# Patient Record
Sex: MALE | Race: WHITE | NOT HISPANIC OR LATINO | Employment: OTHER | URBAN - METROPOLITAN AREA
[De-identification: names, ages, dates, MRNs, and addresses within clinical notes are randomized per-mention and may not be internally consistent; named-entity substitution may affect disease eponyms.]

---

## 2021-08-30 ENCOUNTER — HOSPITAL ENCOUNTER (INPATIENT)
Facility: HOSPITAL | Age: 46
LOS: 6 days | Discharge: HOME | End: 2021-09-05
Attending: EMERGENCY MEDICINE | Admitting: HOSPITALIST

## 2021-08-30 ENCOUNTER — APPOINTMENT (OUTPATIENT)
Dept: CT IMAGING | Facility: HOSPITAL | Age: 46
End: 2021-08-30

## 2021-08-30 DIAGNOSIS — K71.6 DRUG-INDUCED HEPATITIS: ICD-10-CM

## 2021-08-30 DIAGNOSIS — T50.905A DRUG-INDUCED HEPATITIS: ICD-10-CM

## 2021-08-30 DIAGNOSIS — T39.1X1A ACCIDENTAL ACETAMINOPHEN OVERDOSE, INITIAL ENCOUNTER: Primary | ICD-10-CM

## 2021-08-30 LAB
ALBUMIN SERPL-MCNC: 4.8 G/DL (ref 3.5–5.2)
ALBUMIN/GLOB SERPL: 1.5 G/DL
ALP SERPL-CCNC: 95 U/L (ref 39–117)
ALT SERPL W P-5'-P-CCNC: 2561 U/L (ref 1–41)
ANION GAP SERPL CALCULATED.3IONS-SCNC: 13.2 MMOL/L (ref 5–15)
APAP SERPL-MCNC: 10 MCG/ML (ref 0–30)
APTT PPP: 35.9 SECONDS (ref 22.7–35.4)
AST SERPL-CCNC: 3384 U/L (ref 1–40)
BASOPHILS # BLD AUTO: 0.07 10*3/MM3 (ref 0–0.2)
BASOPHILS NFR BLD AUTO: 0.4 % (ref 0–1.5)
BILIRUB SERPL-MCNC: 1.3 MG/DL (ref 0–1.2)
BUN SERPL-MCNC: 15 MG/DL (ref 6–20)
BUN/CREAT SERPL: 13.9 (ref 7–25)
CALCIUM SPEC-SCNC: 9.6 MG/DL (ref 8.6–10.5)
CHLORIDE SERPL-SCNC: 103 MMOL/L (ref 98–107)
CO2 SERPL-SCNC: 22.8 MMOL/L (ref 22–29)
CREAT SERPL-MCNC: 1.08 MG/DL (ref 0.76–1.27)
D-LACTATE SERPL-SCNC: 2.6 MMOL/L (ref 0.5–2)
DEPRECATED RDW RBC AUTO: 43 FL (ref 37–54)
EOSINOPHIL # BLD AUTO: 0 10*3/MM3 (ref 0–0.4)
EOSINOPHIL NFR BLD AUTO: 0 % (ref 0.3–6.2)
ERYTHROCYTE [DISTWIDTH] IN BLOOD BY AUTOMATED COUNT: 12.9 % (ref 12.3–15.4)
ETHANOL BLD-MCNC: <10 MG/DL (ref 0–10)
ETHANOL UR QL: <0.01 %
GFR SERPL CREATININE-BSD FRML MDRD: 74 ML/MIN/1.73
GLOBULIN UR ELPH-MCNC: 3.2 GM/DL
GLUCOSE SERPL-MCNC: 108 MG/DL (ref 65–99)
HCT VFR BLD AUTO: 49.8 % (ref 37.5–51)
HGB BLD-MCNC: 17.6 G/DL (ref 13–17.7)
IMM GRANULOCYTES # BLD AUTO: 0.15 10*3/MM3 (ref 0–0.05)
IMM GRANULOCYTES NFR BLD AUTO: 0.8 % (ref 0–0.5)
INR PPP: 1.7 (ref 0.9–1.1)
LIPASE SERPL-CCNC: 15 U/L (ref 13–60)
LYMPHOCYTES # BLD AUTO: 0.82 10*3/MM3 (ref 0.7–3.1)
LYMPHOCYTES NFR BLD AUTO: 4.6 % (ref 19.6–45.3)
MCH RBC QN AUTO: 32.5 PG (ref 26.6–33)
MCHC RBC AUTO-ENTMCNC: 35.3 G/DL (ref 31.5–35.7)
MCV RBC AUTO: 92.1 FL (ref 79–97)
MONOCYTES # BLD AUTO: 0.54 10*3/MM3 (ref 0.1–0.9)
MONOCYTES NFR BLD AUTO: 3 % (ref 5–12)
NEUTROPHILS NFR BLD AUTO: 16.19 10*3/MM3 (ref 1.7–7)
NEUTROPHILS NFR BLD AUTO: 91.2 % (ref 42.7–76)
NRBC BLD AUTO-RTO: 0 /100 WBC (ref 0–0.2)
PLATELET # BLD AUTO: 169 10*3/MM3 (ref 140–450)
PMV BLD AUTO: 10.2 FL (ref 6–12)
POTASSIUM SERPL-SCNC: 4.2 MMOL/L (ref 3.5–5.2)
PROCALCITONIN SERPL-MCNC: 50.39 NG/ML (ref 0–0.25)
PROT SERPL-MCNC: 8 G/DL (ref 6–8.5)
PROTHROMBIN TIME: 19.8 SECONDS (ref 11.7–14.2)
RBC # BLD AUTO: 5.41 10*6/MM3 (ref 4.14–5.8)
SALICYLATES SERPL-MCNC: <0.3 MG/DL
SODIUM SERPL-SCNC: 139 MMOL/L (ref 136–145)
WBC # BLD AUTO: 17.77 10*3/MM3 (ref 3.4–10.8)

## 2021-08-30 PROCEDURE — 87040 BLOOD CULTURE FOR BACTERIA: CPT | Performed by: EMERGENCY MEDICINE

## 2021-08-30 PROCEDURE — 80143 DRUG ASSAY ACETAMINOPHEN: CPT | Performed by: EMERGENCY MEDICINE

## 2021-08-30 PROCEDURE — 80053 COMPREHEN METABOLIC PANEL: CPT | Performed by: EMERGENCY MEDICINE

## 2021-08-30 PROCEDURE — 87077 CULTURE AEROBIC IDENTIFY: CPT | Performed by: EMERGENCY MEDICINE

## 2021-08-30 PROCEDURE — 25010000003 ACETYLCYSTEINE PER 100 MG: Performed by: EMERGENCY MEDICINE

## 2021-08-30 PROCEDURE — 87186 SC STD MICRODIL/AGAR DIL: CPT | Performed by: EMERGENCY MEDICINE

## 2021-08-30 PROCEDURE — 70450 CT HEAD/BRAIN W/O DYE: CPT

## 2021-08-30 PROCEDURE — 83690 ASSAY OF LIPASE: CPT | Performed by: EMERGENCY MEDICINE

## 2021-08-30 PROCEDURE — 25010000002 PIPERACILLIN SOD-TAZOBACTAM PER 1 G: Performed by: EMERGENCY MEDICINE

## 2021-08-30 PROCEDURE — 0 IOPAMIDOL PER 1 ML: Performed by: HOSPITALIST

## 2021-08-30 PROCEDURE — 93005 ELECTROCARDIOGRAM TRACING: CPT | Performed by: EMERGENCY MEDICINE

## 2021-08-30 PROCEDURE — 74177 CT ABD & PELVIS W/CONTRAST: CPT

## 2021-08-30 PROCEDURE — 83605 ASSAY OF LACTIC ACID: CPT | Performed by: EMERGENCY MEDICINE

## 2021-08-30 PROCEDURE — 80179 DRUG ASSAY SALICYLATE: CPT | Performed by: EMERGENCY MEDICINE

## 2021-08-30 PROCEDURE — 87150 DNA/RNA AMPLIFIED PROBE: CPT | Performed by: EMERGENCY MEDICINE

## 2021-08-30 PROCEDURE — 85610 PROTHROMBIN TIME: CPT | Performed by: EMERGENCY MEDICINE

## 2021-08-30 PROCEDURE — 93010 ELECTROCARDIOGRAM REPORT: CPT | Performed by: INTERNAL MEDICINE

## 2021-08-30 PROCEDURE — G0432 EIA HIV-1/HIV-2 SCREEN: HCPCS | Performed by: EMERGENCY MEDICINE

## 2021-08-30 PROCEDURE — 80074 ACUTE HEPATITIS PANEL: CPT | Performed by: EMERGENCY MEDICINE

## 2021-08-30 PROCEDURE — 85025 COMPLETE CBC W/AUTO DIFF WBC: CPT | Performed by: EMERGENCY MEDICINE

## 2021-08-30 PROCEDURE — U0003 INFECTIOUS AGENT DETECTION BY NUCLEIC ACID (DNA OR RNA); SEVERE ACUTE RESPIRATORY SYNDROME CORONAVIRUS 2 (SARS-COV-2) (CORONAVIRUS DISEASE [COVID-19]), AMPLIFIED PROBE TECHNIQUE, MAKING USE OF HIGH THROUGHPUT TECHNOLOGIES AS DESCRIBED BY CMS-2020-01-R: HCPCS | Performed by: EMERGENCY MEDICINE

## 2021-08-30 PROCEDURE — 99284 EMERGENCY DEPT VISIT MOD MDM: CPT

## 2021-08-30 PROCEDURE — 84145 PROCALCITONIN (PCT): CPT | Performed by: EMERGENCY MEDICINE

## 2021-08-30 PROCEDURE — 85730 THROMBOPLASTIN TIME PARTIAL: CPT | Performed by: EMERGENCY MEDICINE

## 2021-08-30 PROCEDURE — 82077 ASSAY SPEC XCP UR&BREATH IA: CPT | Performed by: EMERGENCY MEDICINE

## 2021-08-30 RX ORDER — SODIUM CHLORIDE 0.9 % (FLUSH) 0.9 %
10 SYRINGE (ML) INJECTION AS NEEDED
Status: DISCONTINUED | OUTPATIENT
Start: 2021-08-30 | End: 2021-09-05 | Stop reason: HOSPADM

## 2021-08-30 RX ADMIN — SODIUM CHLORIDE 2121 ML: 9 INJECTION, SOLUTION INTRAVENOUS at 23:04

## 2021-08-30 RX ADMIN — SODIUM CHLORIDE 1000 ML: 9 INJECTION, SOLUTION INTRAVENOUS at 21:12

## 2021-08-30 RX ADMIN — IOPAMIDOL 85 ML: 755 INJECTION, SOLUTION INTRAVENOUS at 23:38

## 2021-08-30 RX ADMIN — TAZOBACTAM SODIUM AND PIPERACILLIN SODIUM 3.38 G: 375; 3 INJECTION, SOLUTION INTRAVENOUS at 23:16

## 2021-08-30 RX ADMIN — ACETYLCYSTEINE 15000 MG: 200 INJECTION, SOLUTION INTRAVENOUS at 23:17

## 2021-08-31 ENCOUNTER — ON CAMPUS - OUTPATIENT (OUTPATIENT)
Dept: URBAN - METROPOLITAN AREA HOSPITAL 113 | Facility: HOSPITAL | Age: 46
End: 2021-08-31
Payer: MEDICARE

## 2021-08-31 DIAGNOSIS — R94.5 ABNORMAL RESULTS OF LIVER FUNCTION STUDIES: ICD-10-CM

## 2021-08-31 DIAGNOSIS — R11.2 NAUSEA WITH VOMITING, UNSPECIFIED: ICD-10-CM

## 2021-08-31 LAB
ALBUMIN SERPL-MCNC: 3.6 G/DL (ref 3.5–5.2)
ALBUMIN SERPL-MCNC: 4.3 G/DL (ref 3.5–5.2)
ALBUMIN/GLOB SERPL: 1.4 G/DL
ALBUMIN/GLOB SERPL: 1.4 G/DL
ALP SERPL-CCNC: 68 U/L (ref 39–117)
ALP SERPL-CCNC: 78 U/L (ref 39–117)
ALT SERPL W P-5'-P-CCNC: 2363 U/L (ref 1–41)
ALT SERPL W P-5'-P-CCNC: 2427 U/L (ref 1–41)
AMMONIA BLD-SCNC: 37 UMOL/L (ref 16–60)
AMPHET+METHAMPHET UR QL: NEGATIVE
ANION GAP SERPL CALCULATED.3IONS-SCNC: 11.6 MMOL/L (ref 5–15)
ANION GAP SERPL CALCULATED.3IONS-SCNC: 13.8 MMOL/L (ref 5–15)
AST SERPL-CCNC: 2166 U/L (ref 1–40)
AST SERPL-CCNC: 2383 U/L (ref 1–40)
BARBITURATES UR QL SCN: NEGATIVE
BASOPHILS # BLD AUTO: 0.05 10*3/MM3 (ref 0–0.2)
BASOPHILS NFR BLD AUTO: 0.3 % (ref 0–1.5)
BENZODIAZ UR QL SCN: NEGATIVE
BILIRUB SERPL-MCNC: 1.5 MG/DL (ref 0–1.2)
BILIRUB SERPL-MCNC: 1.7 MG/DL (ref 0–1.2)
BUN SERPL-MCNC: 14 MG/DL (ref 6–20)
BUN SERPL-MCNC: 16 MG/DL (ref 6–20)
BUN/CREAT SERPL: 14.6 (ref 7–25)
BUN/CREAT SERPL: 15.4 (ref 7–25)
CALCIUM SPEC-SCNC: 8.2 MG/DL (ref 8.6–10.5)
CALCIUM SPEC-SCNC: 8.7 MG/DL (ref 8.6–10.5)
CANNABINOIDS SERPL QL: NEGATIVE
CHLORIDE SERPL-SCNC: 104 MMOL/L (ref 98–107)
CHLORIDE SERPL-SCNC: 106 MMOL/L (ref 98–107)
CO2 SERPL-SCNC: 22.2 MMOL/L (ref 22–29)
CO2 SERPL-SCNC: 22.4 MMOL/L (ref 22–29)
COCAINE UR QL: NEGATIVE
CREAT SERPL-MCNC: 0.96 MG/DL (ref 0.76–1.27)
CREAT SERPL-MCNC: 1.04 MG/DL (ref 0.76–1.27)
D-LACTATE SERPL-SCNC: 3.1 MMOL/L (ref 0.5–2)
DEPRECATED RDW RBC AUTO: 46.7 FL (ref 37–54)
EOSINOPHIL # BLD AUTO: 0.01 10*3/MM3 (ref 0–0.4)
EOSINOPHIL NFR BLD AUTO: 0.1 % (ref 0.3–6.2)
ERYTHROCYTE [DISTWIDTH] IN BLOOD BY AUTOMATED COUNT: 13.2 % (ref 12.3–15.4)
GFR SERPL CREATININE-BSD FRML MDRD: 77 ML/MIN/1.73
GFR SERPL CREATININE-BSD FRML MDRD: 85 ML/MIN/1.73
GGT SERPL-CCNC: 61 U/L (ref 8–61)
GLOBULIN UR ELPH-MCNC: 2.6 GM/DL
GLOBULIN UR ELPH-MCNC: 3 GM/DL
GLUCOSE SERPL-MCNC: 100 MG/DL (ref 65–99)
GLUCOSE SERPL-MCNC: 105 MG/DL (ref 65–99)
HAV IGM SERPL QL IA: NORMAL
HBV CORE IGM SERPL QL IA: NORMAL
HBV SURFACE AG SERPL QL IA: NORMAL
HCT VFR BLD AUTO: 50.1 % (ref 37.5–51)
HCV AB SER DONR QL: NORMAL
HGB BLD-MCNC: 16.6 G/DL (ref 13–17.7)
HIV1+2 AB SER QL: NORMAL
IMM GRANULOCYTES # BLD AUTO: 0.08 10*3/MM3 (ref 0–0.05)
IMM GRANULOCYTES NFR BLD AUTO: 0.5 % (ref 0–0.5)
INR PPP: 2.33 (ref 0.9–1.1)
LYMPHOCYTES # BLD AUTO: 1.26 10*3/MM3 (ref 0.7–3.1)
LYMPHOCYTES NFR BLD AUTO: 7.5 % (ref 19.6–45.3)
MCH RBC QN AUTO: 31.7 PG (ref 26.6–33)
MCHC RBC AUTO-ENTMCNC: 33.1 G/DL (ref 31.5–35.7)
MCV RBC AUTO: 95.6 FL (ref 79–97)
METHADONE UR QL SCN: NEGATIVE
MONOCYTES # BLD AUTO: 0.64 10*3/MM3 (ref 0.1–0.9)
MONOCYTES NFR BLD AUTO: 3.8 % (ref 5–12)
NEUTROPHILS NFR BLD AUTO: 14.81 10*3/MM3 (ref 1.7–7)
NEUTROPHILS NFR BLD AUTO: 87.8 % (ref 42.7–76)
NRBC BLD AUTO-RTO: 0 /100 WBC (ref 0–0.2)
OPIATES UR QL: NEGATIVE
OXYCODONE UR QL SCN: NEGATIVE
PLATELET # BLD AUTO: 153 10*3/MM3 (ref 140–450)
PMV BLD AUTO: 10.1 FL (ref 6–12)
POTASSIUM SERPL-SCNC: 3.7 MMOL/L (ref 3.5–5.2)
POTASSIUM SERPL-SCNC: 4.2 MMOL/L (ref 3.5–5.2)
PROT SERPL-MCNC: 6.2 G/DL (ref 6–8.5)
PROT SERPL-MCNC: 7.3 G/DL (ref 6–8.5)
PROTHROMBIN TIME: 25.3 SECONDS (ref 11.7–14.2)
QT INTERVAL: 331 MS
RBC # BLD AUTO: 5.24 10*6/MM3 (ref 4.14–5.8)
SARS-COV-2 RNA RESP QL NAA+PROBE: NOT DETECTED
SODIUM SERPL-SCNC: 138 MMOL/L (ref 136–145)
SODIUM SERPL-SCNC: 142 MMOL/L (ref 136–145)
WBC # BLD AUTO: 16.85 10*3/MM3 (ref 3.4–10.8)

## 2021-08-31 PROCEDURE — 25010000003 ACETYLCYSTEINE PER 100 MG: Performed by: INTERNAL MEDICINE

## 2021-08-31 PROCEDURE — 80307 DRUG TEST PRSMV CHEM ANLYZR: CPT | Performed by: EMERGENCY MEDICINE

## 2021-08-31 PROCEDURE — 36415 COLL VENOUS BLD VENIPUNCTURE: CPT | Performed by: EMERGENCY MEDICINE

## 2021-08-31 PROCEDURE — 86664 EPSTEIN-BARR NUCLEAR ANTIGEN: CPT | Performed by: INTERNAL MEDICINE

## 2021-08-31 PROCEDURE — 25010000002 PIPERACILLIN SOD-TAZOBACTAM PER 1 G: Performed by: HOSPITALIST

## 2021-08-31 PROCEDURE — 85025 COMPLETE CBC W/AUTO DIFF WBC: CPT | Performed by: HOSPITALIST

## 2021-08-31 PROCEDURE — 82977 ASSAY OF GGT: CPT | Performed by: HOSPITALIST

## 2021-08-31 PROCEDURE — 25010000002 ONDANSETRON PER 1 MG: Performed by: HOSPITALIST

## 2021-08-31 PROCEDURE — 82140 ASSAY OF AMMONIA: CPT | Performed by: INTERNAL MEDICINE

## 2021-08-31 PROCEDURE — 80053 COMPREHEN METABOLIC PANEL: CPT | Performed by: INTERNAL MEDICINE

## 2021-08-31 PROCEDURE — 90791 PSYCH DIAGNOSTIC EVALUATION: CPT

## 2021-08-31 PROCEDURE — 83605 ASSAY OF LACTIC ACID: CPT | Performed by: EMERGENCY MEDICINE

## 2021-08-31 PROCEDURE — 99222 1ST HOSP IP/OBS MODERATE 55: CPT | Performed by: INTERNAL MEDICINE

## 2021-08-31 PROCEDURE — 85610 PROTHROMBIN TIME: CPT | Performed by: INTERNAL MEDICINE

## 2021-08-31 PROCEDURE — 86665 EPSTEIN-BARR CAPSID VCA: CPT | Performed by: INTERNAL MEDICINE

## 2021-08-31 PROCEDURE — 80053 COMPREHEN METABOLIC PANEL: CPT | Performed by: HOSPITALIST

## 2021-08-31 RX ORDER — FAMOTIDINE 10 MG/ML
20 INJECTION, SOLUTION INTRAVENOUS EVERY 12 HOURS SCHEDULED
Status: DISCONTINUED | OUTPATIENT
Start: 2021-08-31 | End: 2021-09-01

## 2021-08-31 RX ORDER — SODIUM CHLORIDE 9 MG/ML
175 INJECTION, SOLUTION INTRAVENOUS CONTINUOUS
Status: DISCONTINUED | OUTPATIENT
Start: 2021-08-31 | End: 2021-09-01

## 2021-08-31 RX ORDER — ONDANSETRON 2 MG/ML
4 INJECTION INTRAMUSCULAR; INTRAVENOUS EVERY 4 HOURS PRN
Status: DISCONTINUED | OUTPATIENT
Start: 2021-08-31 | End: 2021-09-05

## 2021-08-31 RX ADMIN — SODIUM CHLORIDE 125 ML/HR: 9 INJECTION, SOLUTION INTRAVENOUS at 11:18

## 2021-08-31 RX ADMIN — ONDANSETRON 4 MG: 2 INJECTION INTRAMUSCULAR; INTRAVENOUS at 03:27

## 2021-08-31 RX ADMIN — FAMOTIDINE 20 MG: 10 INJECTION INTRAVENOUS at 20:11

## 2021-08-31 RX ADMIN — SODIUM CHLORIDE 175 ML/HR: 9 INJECTION, SOLUTION INTRAVENOUS at 18:24

## 2021-08-31 RX ADMIN — FAMOTIDINE 20 MG: 10 INJECTION INTRAVENOUS at 08:32

## 2021-08-31 RX ADMIN — ACETYLCYSTEINE 10000 MG: 200 INJECTION, SOLUTION INTRAVENOUS at 20:11

## 2021-08-31 RX ADMIN — TAZOBACTAM SODIUM AND PIPERACILLIN SODIUM 3.38 G: 375; 3 INJECTION, SOLUTION INTRAVENOUS at 11:17

## 2021-08-31 RX ADMIN — SODIUM CHLORIDE 125 ML/HR: 9 INJECTION, SOLUTION INTRAVENOUS at 03:27

## 2021-08-31 RX ADMIN — ACETYLCYSTEINE 5000 MG: 200 INJECTION, SOLUTION INTRAVENOUS at 15:27

## 2021-08-31 RX ADMIN — ONDANSETRON 4 MG: 2 INJECTION INTRAMUSCULAR; INTRAVENOUS at 20:11

## 2021-08-31 RX ADMIN — TAZOBACTAM SODIUM AND PIPERACILLIN SODIUM 3.38 G: 375; 3 INJECTION, SOLUTION INTRAVENOUS at 20:11

## 2021-09-01 LAB
ALBUMIN SERPL-MCNC: 2.9 G/DL (ref 3.5–5.2)
ALBUMIN SERPL-MCNC: 3 G/DL (ref 3.5–5.2)
ALBUMIN/GLOB SERPL: 0.9 G/DL
ALBUMIN/GLOB SERPL: 1.1 G/DL
ALP SERPL-CCNC: 58 U/L (ref 39–117)
ALP SERPL-CCNC: 58 U/L (ref 39–117)
ALT SERPL W P-5'-P-CCNC: 2106 U/L (ref 1–41)
ALT SERPL W P-5'-P-CCNC: 2175 U/L (ref 1–41)
AMMONIA BLD-SCNC: 67 UMOL/L (ref 16–60)
ANION GAP SERPL CALCULATED.3IONS-SCNC: 10.5 MMOL/L (ref 5–15)
ANION GAP SERPL CALCULATED.3IONS-SCNC: 9.3 MMOL/L (ref 5–15)
AST SERPL-CCNC: 1055 U/L (ref 1–40)
AST SERPL-CCNC: 1238 U/L (ref 1–40)
BACTERIA BLD CULT: NORMAL
BASOPHILS # BLD AUTO: 0.08 10*3/MM3 (ref 0–0.2)
BASOPHILS NFR BLD AUTO: 0.8 % (ref 0–1.5)
BILIRUB SERPL-MCNC: 1.5 MG/DL (ref 0–1.2)
BILIRUB SERPL-MCNC: 1.6 MG/DL (ref 0–1.2)
BUN SERPL-MCNC: 12 MG/DL (ref 6–20)
BUN SERPL-MCNC: 13 MG/DL (ref 6–20)
BUN/CREAT SERPL: 15.4 (ref 7–25)
BUN/CREAT SERPL: 20 (ref 7–25)
CALCIUM SPEC-SCNC: 8.4 MG/DL (ref 8.6–10.5)
CALCIUM SPEC-SCNC: 8.6 MG/DL (ref 8.6–10.5)
CHLORIDE SERPL-SCNC: 106 MMOL/L (ref 98–107)
CHLORIDE SERPL-SCNC: 106 MMOL/L (ref 98–107)
CHOLEST SERPL-MCNC: 84 MG/DL (ref 0–200)
CO2 SERPL-SCNC: 20.5 MMOL/L (ref 22–29)
CO2 SERPL-SCNC: 20.7 MMOL/L (ref 22–29)
CREAT SERPL-MCNC: 0.65 MG/DL (ref 0.76–1.27)
CREAT SERPL-MCNC: 0.78 MG/DL (ref 0.76–1.27)
DEPRECATED RDW RBC AUTO: 41.1 FL (ref 37–54)
EBV NA IGG SER IA-ACNC: >600 U/ML (ref 0–17.9)
EBV VCA IGG SER IA-ACNC: >600 U/ML (ref 0–17.9)
EBV VCA IGM SER IA-ACNC: <36 U/ML (ref 0–35.9)
EOSINOPHIL # BLD AUTO: 0.18 10*3/MM3 (ref 0–0.4)
EOSINOPHIL NFR BLD AUTO: 1.9 % (ref 0.3–6.2)
ERYTHROCYTE [DISTWIDTH] IN BLOOD BY AUTOMATED COUNT: 12.7 % (ref 12.3–15.4)
GFR SERPL CREATININE-BSD FRML MDRD: 108 ML/MIN/1.73
GFR SERPL CREATININE-BSD FRML MDRD: 133 ML/MIN/1.73
GLOBULIN UR ELPH-MCNC: 2.8 GM/DL
GLOBULIN UR ELPH-MCNC: 3.3 GM/DL
GLUCOSE SERPL-MCNC: 100 MG/DL (ref 65–99)
GLUCOSE SERPL-MCNC: 100 MG/DL (ref 65–99)
HBA1C MFR BLD: 5.1 % (ref 4.8–5.6)
HCT VFR BLD AUTO: 36.9 % (ref 37.5–51)
HDLC SERPL-MCNC: 16 MG/DL (ref 40–60)
HGB BLD-MCNC: 13.2 G/DL (ref 13–17.7)
INR PPP: 1.82 (ref 0.9–1.1)
INR PPP: 1.93 (ref 0.9–1.1)
LDLC SERPL CALC-MCNC: 43 MG/DL (ref 0–100)
LDLC/HDLC SERPL: 2.49 {RATIO}
LYMPHOCYTES # BLD AUTO: 1.98 10*3/MM3 (ref 0.7–3.1)
LYMPHOCYTES NFR BLD AUTO: 20.9 % (ref 19.6–45.3)
MCH RBC QN AUTO: 32.4 PG (ref 26.6–33)
MCHC RBC AUTO-ENTMCNC: 35.8 G/DL (ref 31.5–35.7)
MCV RBC AUTO: 90.7 FL (ref 79–97)
MONOCYTES # BLD AUTO: 0.71 10*3/MM3 (ref 0.1–0.9)
MONOCYTES NFR BLD AUTO: 7.5 % (ref 5–12)
NEUTROPHILS NFR BLD AUTO: 6.47 10*3/MM3 (ref 1.7–7)
NEUTROPHILS NFR BLD AUTO: 68.3 % (ref 42.7–76)
NT-PROBNP SERPL-MCNC: 172.5 PG/ML (ref 0–450)
PLATELET # BLD AUTO: 139 10*3/MM3 (ref 140–450)
PMV BLD AUTO: 10.6 FL (ref 6–12)
POTASSIUM SERPL-SCNC: 3.3 MMOL/L (ref 3.5–5.2)
POTASSIUM SERPL-SCNC: 3.4 MMOL/L (ref 3.5–5.2)
PROT SERPL-MCNC: 5.8 G/DL (ref 6–8.5)
PROT SERPL-MCNC: 6.2 G/DL (ref 6–8.5)
PROTHROMBIN TIME: 20.8 SECONDS (ref 11.7–14.2)
PROTHROMBIN TIME: 21.9 SECONDS (ref 11.7–14.2)
RBC # BLD AUTO: 4.07 10*6/MM3 (ref 4.14–5.8)
SERVICE CMNT-IMP: ABNORMAL
SODIUM SERPL-SCNC: 136 MMOL/L (ref 136–145)
SODIUM SERPL-SCNC: 137 MMOL/L (ref 136–145)
TRIGL SERPL-MCNC: 141 MG/DL (ref 0–150)
TSH SERPL DL<=0.05 MIU/L-ACNC: 6.94 UIU/ML (ref 0.27–4.2)
VLDLC SERPL-MCNC: 25 MG/DL (ref 5–40)
WBC # BLD AUTO: 9.48 10*3/MM3 (ref 3.4–10.8)

## 2021-09-01 PROCEDURE — 25010000002 PIPERACILLIN SOD-TAZOBACTAM PER 1 G: Performed by: HOSPITALIST

## 2021-09-01 PROCEDURE — 82140 ASSAY OF AMMONIA: CPT | Performed by: HOSPITALIST

## 2021-09-01 PROCEDURE — 99232 SBSQ HOSP IP/OBS MODERATE 35: CPT | Performed by: INTERNAL MEDICINE

## 2021-09-01 PROCEDURE — 85025 COMPLETE CBC W/AUTO DIFF WBC: CPT | Performed by: HOSPITALIST

## 2021-09-01 PROCEDURE — 84443 ASSAY THYROID STIM HORMONE: CPT | Performed by: HOSPITALIST

## 2021-09-01 PROCEDURE — 80053 COMPREHEN METABOLIC PANEL: CPT | Performed by: HOSPITALIST

## 2021-09-01 PROCEDURE — 25010000002 ONDANSETRON PER 1 MG: Performed by: HOSPITALIST

## 2021-09-01 PROCEDURE — 80061 LIPID PANEL: CPT | Performed by: HOSPITALIST

## 2021-09-01 PROCEDURE — 87040 BLOOD CULTURE FOR BACTERIA: CPT | Performed by: HOSPITALIST

## 2021-09-01 PROCEDURE — 83880 ASSAY OF NATRIURETIC PEPTIDE: CPT | Performed by: HOSPITALIST

## 2021-09-01 PROCEDURE — 80053 COMPREHEN METABOLIC PANEL: CPT | Performed by: INTERNAL MEDICINE

## 2021-09-01 PROCEDURE — 25010000002 SODIUM CHLORIDE 0.9 % WITH KCL 20 MEQ 20-0.9 MEQ/L-% SOLUTION: Performed by: HOSPITALIST

## 2021-09-01 PROCEDURE — 83036 HEMOGLOBIN GLYCOSYLATED A1C: CPT | Performed by: HOSPITALIST

## 2021-09-01 PROCEDURE — 85610 PROTHROMBIN TIME: CPT | Performed by: INTERNAL MEDICINE

## 2021-09-01 PROCEDURE — 85610 PROTHROMBIN TIME: CPT | Performed by: HOSPITALIST

## 2021-09-01 RX ORDER — LACTULOSE 10 G/15ML
10 SOLUTION ORAL 2 TIMES DAILY
Status: DISCONTINUED | OUTPATIENT
Start: 2021-09-01 | End: 2021-09-02

## 2021-09-01 RX ORDER — SODIUM CHLORIDE AND POTASSIUM CHLORIDE 150; 900 MG/100ML; MG/100ML
75 INJECTION, SOLUTION INTRAVENOUS CONTINUOUS
Status: DISCONTINUED | OUTPATIENT
Start: 2021-09-01 | End: 2021-09-05

## 2021-09-01 RX ORDER — PANTOPRAZOLE SODIUM 40 MG/1
40 TABLET, DELAYED RELEASE ORAL
Status: DISCONTINUED | OUTPATIENT
Start: 2021-09-01 | End: 2021-09-05 | Stop reason: HOSPADM

## 2021-09-01 RX ORDER — SUCRALFATE 1 G/1
1 TABLET ORAL
Status: DISCONTINUED | OUTPATIENT
Start: 2021-09-01 | End: 2021-09-05 | Stop reason: HOSPADM

## 2021-09-01 RX ORDER — PANTOPRAZOLE SODIUM 40 MG/1
40 TABLET, DELAYED RELEASE ORAL
Status: DISCONTINUED | OUTPATIENT
Start: 2021-09-02 | End: 2021-09-01

## 2021-09-01 RX ADMIN — TAZOBACTAM SODIUM AND PIPERACILLIN SODIUM 3.38 G: 375; 3 INJECTION, SOLUTION INTRAVENOUS at 19:47

## 2021-09-01 RX ADMIN — LACTULOSE 10 G: 10 SOLUTION ORAL at 11:24

## 2021-09-01 RX ADMIN — FAMOTIDINE 20 MG: 10 INJECTION INTRAVENOUS at 08:16

## 2021-09-01 RX ADMIN — SUCRALFATE 1 G: 1 TABLET ORAL at 11:24

## 2021-09-01 RX ADMIN — POTASSIUM CHLORIDE AND SODIUM CHLORIDE 125 ML/HR: 900; 150 INJECTION, SOLUTION INTRAVENOUS at 13:30

## 2021-09-01 RX ADMIN — LACTULOSE 10 G: 10 SOLUTION ORAL at 19:46

## 2021-09-01 RX ADMIN — SODIUM CHLORIDE 175 ML/HR: 9 INJECTION, SOLUTION INTRAVENOUS at 08:15

## 2021-09-01 RX ADMIN — TAZOBACTAM SODIUM AND PIPERACILLIN SODIUM 3.38 G: 375; 3 INJECTION, SOLUTION INTRAVENOUS at 04:05

## 2021-09-01 RX ADMIN — SUCRALFATE 1 G: 1 TABLET ORAL at 18:21

## 2021-09-01 RX ADMIN — ONDANSETRON 4 MG: 2 INJECTION INTRAMUSCULAR; INTRAVENOUS at 11:24

## 2021-09-01 RX ADMIN — PANTOPRAZOLE SODIUM 40 MG: 40 TABLET, DELAYED RELEASE ORAL at 18:21

## 2021-09-01 RX ADMIN — ONDANSETRON 4 MG: 2 INJECTION INTRAMUSCULAR; INTRAVENOUS at 19:46

## 2021-09-01 RX ADMIN — SUCRALFATE 1 G: 1 TABLET ORAL at 19:46

## 2021-09-01 RX ADMIN — ONDANSETRON 4 MG: 2 INJECTION INTRAMUSCULAR; INTRAVENOUS at 05:46

## 2021-09-01 RX ADMIN — TAZOBACTAM SODIUM AND PIPERACILLIN SODIUM 3.38 G: 375; 3 INJECTION, SOLUTION INTRAVENOUS at 11:24

## 2021-09-01 RX ADMIN — SODIUM CHLORIDE 175 ML/HR: 9 INJECTION, SOLUTION INTRAVENOUS at 01:31

## 2021-09-01 NOTE — PROGRESS NOTES
"Daily progress note    Chief complaint  Doing much better  No specific complaints  Denies any nausea vomiting diarrhea    History of present illness  46-year-old white male with no medical problem and no home medications presented to [facility] with nausea vomiting started yesterday early in the morning.  Patient apparently took a lot of Tylenol secondary to toothache.  Patient denies any suicidal homicidal ideation.  Patient denies any Tylenol abuse in the past.  Patient work-up in ER revealed Tylenol toxicity admit for management.      REVIEW OF SYSTEMS  All systems reviewed and negative except for those discussed in HPI.      PHYSICAL EXAM   Blood pressure 137/93, pulse 64, temperature 97.6 °F (36.4 °C), temperature source Oral, resp. rate 18, height 175.3 cm (69\"), weight 119 kg (262 lb 5.6 oz), SpO2 90 %.    GENERAL: Awake and alert, nontoxic-appearing  HENT: nares patent  EYES: no scleral icterus  CV: regular rhythm, regular rate  RESPIRATORY: normal effort  ABDOMEN: soft, mild tenderness without rebound or guarding bowel sounds present  MUSCULOSKELETAL: no deformity  NEURO: alert, moves all extremities, follows commands  SKIN: warm, dry     LAB RESULTS  Lab Results (last 24 hours)     Procedure Component Value Units Date/Time    Comprehensive Metabolic Panel [[id number]]  (Abnormal) Collected: 09/01/21 1209    Specimen: Blood Updated: 09/01/21 1317     Glucose 100 mg/dL      BUN 12 mg/dL      Creatinine 0.78 mg/dL      Sodium 136 mmol/L      Potassium 3.4 mmol/L      Chloride 106 mmol/L      CO2 20.7 mmol/L      Calcium 8.6 mg/dL      Total Protein 6.2 g/dL      Albumin 2.90 g/dL      ALT (SGPT) 2,106 U/L      AST (SGOT) 1,055 U/L      Alkaline Phosphatase 58 U/L      Total Bilirubin 1.5 mg/dL      eGFR Non African Amer 108 mL/min/1.73      Globulin 3.3 gm/dL      A/G Ratio 0.9 g/dL      BUN/Creatinine Ratio 15.4     Anion Gap 9.3 mmol/L     Narrative:      GFR Normal >60  Chronic Kidney Disease " <60  Kidney Failure <15      Blood Culture - Blood, Arm, Left [[id number]]  (Abnormal) Collected: 08/30/21 2154    Specimen: Blood from Arm, Left Updated: 09/01/21 1308     Blood Culture Abnormal Stain     Gram Stain Aerobic Bottle Gram negative bacilli    Blood Culture ID, PCR - Blood, Arm, Left [[id number]] Collected: 08/30/21 2154    Specimen: Blood from Arm, Left Updated: 09/01/21 1307    Protime-INR [[id number]]  (Abnormal) Collected: 09/01/21 1210    Specimen: Blood Updated: 09/01/21 1255     Protime 20.8 Seconds      INR 1.82    Comprehensive Metabolic Panel [[id number]]  (Abnormal) Collected: 09/01/21 0530    Specimen: Blood Updated: 09/01/21 0652     Glucose 100 mg/dL      BUN 13 mg/dL      Creatinine 0.65 mg/dL      Sodium 137 mmol/L      Potassium 3.3 mmol/L      Chloride 106 mmol/L      CO2 20.5 mmol/L      Calcium 8.4 mg/dL      Total Protein 5.8 g/dL      Albumin 3.00 g/dL      ALT (SGPT) 2,175 U/L      AST (SGOT) 1,238 U/L      Alkaline Phosphatase 58 U/L      Total Bilirubin 1.6 mg/dL      eGFR Non African Amer 133 mL/min/1.73      Globulin 2.8 gm/dL      A/G Ratio 1.1 g/dL      BUN/Creatinine Ratio 20.0     Anion Gap 10.5 mmol/L     Narrative:      GFR Normal >60  Chronic Kidney Disease <60  Kidney Failure <15      TSH [[id number]]  (Abnormal) Collected: 09/01/21 0530    Specimen: Blood Updated: 09/01/21 0645     TSH 6.940 uIU/mL     BNP [[id number]]  (Normal) Collected: 09/01/21 0530    Specimen: Blood Updated: 09/01/21 0645     proBNP 172.5 pg/mL     Narrative:      Among patients with dyspnea, NT-proBNP is highly sensitive for the detection of acute congestive heart failure. In addition NT-proBNP of <300 pg/ml effectively rules out acute congestive heart failure with 99% negative predictive value.    Results may be falsely decreased if patient taking Biotin.      Lipid Panel [[id number]]  (Abnormal) Collected: 09/01/21 0530    Specimen: Blood Updated: 09/01/21 0639     Total Cholesterol 84 mg/dL       Triglycerides 141 mg/dL      HDL Cholesterol 16 mg/dL      LDL Cholesterol  43 mg/dL      VLDL Cholesterol 25 mg/dL      LDL/HDL Ratio 2.49    Narrative:      Cholesterol Reference Ranges  (U.S. Department of Health and Human Services ATP III Classifications)    Desirable          <200 mg/dL  Borderline High    200-239 mg/dL  High Risk          >240 mg/dL      Triglyceride Reference Ranges  (U.S. Department of Health and Human Services ATP III Classifications)    Normal           <150 mg/dL  Borderline High  150-199 mg/dL  High             200-499 mg/dL  Very High        >500 mg/dL    HDL Reference Ranges  (U.S. Department of Health and Human Services ATP III Classifcations)    Low     <40 mg/dl (major risk factor for CHD)  High    >60 mg/dl ('negative' risk factor for CHD)        LDL Reference Ranges  (U.S. Department of Health and Human Services ATP III Classifcations)    Optimal          <100 mg/dL  Near Optimal     100-129 mg/dL  Borderline High  130-159 mg/dL  High             160-189 mg/dL  Very High        >189 mg/dL    CBC & Differential [[id number]]  (Abnormal) Collected: 09/01/21 0530    Specimen: Blood Updated: 09/01/21 0631    Narrative:      The following orders were created for panel order CBC & Differential.  Procedure                               Abnormality         Status                     ---------                               -----------         ------                     CBC Auto Differential[[id number]]        Abnormal            Final result                 Please view results for these tests on the individual orders.    CBC Auto Differential [[id number]]  (Abnormal) Collected: 09/01/21 0530    Specimen: Blood Updated: 09/01/21 0631     WBC 9.48 10*3/mm3      RBC 4.07 10*6/mm3      Hemoglobin 13.2 g/dL      Hematocrit 36.9 %      MCV 90.7 fL      MCH 32.4 pg      MCHC 35.8 g/dL      RDW 12.7 %      RDW-SD 41.1 fl      MPV 10.6 fL      Platelets 139 10*3/mm3      Neutrophil % 68.3 %       Lymphocyte % 20.9 %      Monocyte % 7.5 %      Eosinophil % 1.9 %      Basophil % 0.8 %      Neutrophils, Absolute 6.47 10*3/mm3      Lymphocytes, Absolute 1.98 10*3/mm3      Monocytes, Absolute 0.71 10*3/mm3      Eosinophils, Absolute 0.18 10*3/mm3      Basophils, Absolute 0.08 10*3/mm3     Ammonia [[id number]]  (Abnormal) Collected: 09/01/21 0530    Specimen: Blood Updated: 09/01/21 0628     Ammonia 67 umol/L     Hemoglobin A1c [[id number]]  (Normal) Collected: 09/01/21 0530    Specimen: Blood Updated: 09/01/21 0611     Hemoglobin A1C 5.10 %     Narrative:      Hemoglobin A1C Ranges:    Increased Risk for Diabetes  5.7% to 6.4%  Diabetes                     >= 6.5%  Diabetic Goal                < 7.0%    Protime-INR [[id number]]  (Abnormal) Collected: 09/01/21 0530    Specimen: Blood Updated: 09/01/21 0611     Protime 21.9 Seconds      INR 1.93    Blood Culture - Blood, Arm, Left [[id number]] Collected: 08/30/21 2154    Specimen: Blood from Arm, Left Updated: 08/31/21 2215     Blood Culture No growth at 24 hours        Imaging Results (Last 24 Hours)     ** No results found for the last 24 hours. **        ECG 12 Lead  Component   Ref Range & Units 8/30/21 2052   QT Interval   ms 331         HEART RATE= 91  bpm  RR Interval= 660  ms  UT Interval= 147  ms  P Horizontal Axis= -19  deg  P Front Axis= 50  deg  QRSD Interval= 93  ms  QT Interval= 331  ms  QRS Axis= 25  deg  T Wave Axis= 25  deg  - BORDERLINE ECG -  Sinus rhythm  Probable left atrial enlargement  NO PRIOR TRACING AVAILABLE FOR COMPARISON             Current Facility-Administered Medications:   •  famotidine (PEPCID) injection 20 mg, 20 mg, Intravenous, Q12H, Gopi Peralta MD, 20 mg at 09/01/21 0816  •  lactulose (CHRONULAC) 10 GM/15ML solution 10 g, 10 g, Oral, BID, Shahbaz Can MD, 10 g at 09/01/21 1124  •  ondansetron (ZOFRAN) injection 4 mg, 4 mg, Intravenous, Q4H PRN, Gopi Peralta MD, 4 mg at 09/01/21 1124  •  piperacillin-tazobactam (ZOSYN) 3.375  g in iso-osmotic dextrose 50 ml (premix), 3.375 g, Intravenous, Q8H, Bernadette Celeste MD, 3.375 g at 09/01/21 1124  •  [COMPLETED] Insert peripheral IV, , , Once **AND** sodium chloride 0.9 % flush 10 mL, 10 mL, Intravenous, PRN, Edwin Hein MD  •  sodium chloride 0.9 % with KCl 20 mEq/L infusion, 125 mL/hr, Intravenous, Continuous, Bernadette Celeste MD, Last Rate: 125 mL/hr at 09/01/21 1330, 125 mL/hr at 09/01/21 1330  •  sucralfate (CARAFATE) tablet 1 g, 1 g, Oral, 4x Daily AC & at Bedtime, Shahbaz Can MD, 1 g at 09/01/21 1124     ASSESSMENT  GNB bacteremia and sepsis  Tylenol toxicity  Elevated LFTs improving  Diffuse wall thickening of the colon    PLAN  CPM  S/P Acetodate  IVF  Continue IV antibiotics  Infectious disease consult  GI to follow patient  Symptomatic treatment for pain nausea vomiting  Supportive care  Discussed with nursing staff  Follow closely with recommendation current hospital course    BERNADETTE CELESTE MD

## 2021-09-01 NOTE — PLAN OF CARE
Goal Outcome Evaluation:  Plan of Care Reviewed With: patient        Progress: no change  Outcome Summary: A&O. RA. SR. IVF per order. 3rd dose of IV Acetylcysteine infusing throughout shift. IV abx per order. Up with standby. Voiding per BR. Heating pad for neck pain. Zofran PRN given for nausea. Will continue to monitor.

## 2021-09-01 NOTE — PROGRESS NOTES
[facility]     Progress Note    Patient Name: Pascual Patel  : 1975  MRN: [id number]  Primary Care Physician:  Provider, No Known  Date of admission: 2021    Subjective   Subjective     Chief Complaint: liver injury    History of Present Illness  Patient Reports feels achy,  Less uncomfortable, nausea is better,  He was more than liquid to consume.  Feeling  Better overall     Review of Systems   Gastrointestinal: Positive for abdominal pain and nausea.   Neurological: Positive for weakness.       Objective   Objective     Vitals:   Temp:  [98 °F (36.7 °C)-99.2 °F (37.3 °C)] 99.2 °F (37.3 °C)  Heart Rate:  [] 71  Resp:  [16-18] 18  BP: (111-142)/(70-83) 111/70    Physical Exam  HENT:      Right Ear: External ear normal.      Left Ear: External ear normal.      Mouth/Throat:      Pharynx: Oropharynx is clear.   Eyes:      Conjunctiva/sclera: Conjunctivae normal.   Cardiovascular:      Rate and Rhythm: Normal rate.      Pulses: Normal pulses.   Pulmonary:      Effort: Pulmonary effort is normal.   Abdominal:      General: Abdomen is flat. There is no distension.      Palpations: There is no mass.   Neurological:      General: No focal deficit present.      Mental Status: He is alert.   Psychiatric:         Mood and Affect: Mood normal.          Result Review    Result Review:  I have personally reviewed the results from the time of this admission to 2021 07:13 EDT and agree with these findings:  []  Laboratory  []  Microbiology  []  Radiology  []  EKG/Telemetry   []  Cardiology/Vascular   []  Pathology  []  Old records  []  Other:    Assessment/Plan   Assessment / Plan     Brief Patient Summary:  Pascual Patel is a 46 y.o. male who  Acute liver injury from tylenol  Coagulopathy improving  Mild encephalopathy  Dyspepsia/nausea    Active Hospital Problems:  Active Hospital Problems    Diagnosis    • Acetaminophen overdose      Plan: start on sucralfate  Start lactulose  Since inr is  PATIENT WOULD LIKE A CALL BACK FROM MARKY REGARDING WHICH BACK BRACE SHE WANTS HER TO WEAR; PLEASE CALL TO ADVISE    ALISA: [phone]   improving hold on transfer to transplant center, but carefully monitor.       DVT prophylaxis:  Mechanical DVT prophylaxis orders are present.    CODE STATUS:    Code Status: CPR  Medical Interventions (Level of Support Prior to Arrest): Full    Disposition:  I expect patient to be discharged uncertain at this juncture     Electronically signed by Shahbaz Can MD, 09/01/21, 7:13 AM EDT.
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
## 2021-09-02 ENCOUNTER — INPATIENT HOSPITAL (OUTPATIENT)
Dept: URBAN - METROPOLITAN AREA HOSPITAL 113 | Facility: HOSPITAL | Age: 46
End: 2021-09-02
Payer: MEDICARE

## 2021-09-02 DIAGNOSIS — R11.2 NAUSEA WITH VOMITING, UNSPECIFIED: ICD-10-CM

## 2021-09-02 DIAGNOSIS — R94.5 ABNORMAL RESULTS OF LIVER FUNCTION STUDIES: ICD-10-CM

## 2021-09-02 LAB
ALBUMIN SERPL-MCNC: 3.3 G/DL (ref 3.5–5.2)
ALBUMIN/GLOB SERPL: 1.2 G/DL
ALP SERPL-CCNC: 63 U/L (ref 39–117)
ALT SERPL W P-5'-P-CCNC: 1643 U/L (ref 1–41)
AMMONIA BLD-SCNC: 70 UMOL/L (ref 16–60)
ANION GAP SERPL CALCULATED.3IONS-SCNC: 8.1 MMOL/L (ref 5–15)
AST SERPL-CCNC: 636 U/L (ref 1–40)
BILIRUB SERPL-MCNC: 1.2 MG/DL (ref 0–1.2)
BUN SERPL-MCNC: 9 MG/DL (ref 6–20)
BUN/CREAT SERPL: 11.7 (ref 7–25)
CALCIUM SPEC-SCNC: 8.3 MG/DL (ref 8.6–10.5)
CHLORIDE SERPL-SCNC: 108 MMOL/L (ref 98–107)
CO2 SERPL-SCNC: 20.9 MMOL/L (ref 22–29)
CREAT SERPL-MCNC: 0.77 MG/DL (ref 0.76–1.27)
DEPRECATED RDW RBC AUTO: 44 FL (ref 37–54)
EOSINOPHIL # BLD MANUAL: 0.08 10*3/MM3 (ref 0–0.4)
EOSINOPHIL NFR BLD MANUAL: 1 % (ref 0.3–6.2)
ERYTHROCYTE [DISTWIDTH] IN BLOOD BY AUTOMATED COUNT: 12.8 % (ref 12.3–15.4)
GFR SERPL CREATININE-BSD FRML MDRD: 109 ML/MIN/1.73
GLOBULIN UR ELPH-MCNC: 2.8 GM/DL
GLUCOSE SERPL-MCNC: 77 MG/DL (ref 65–99)
HCT VFR BLD AUTO: 38.5 % (ref 37.5–51)
HGB BLD-MCNC: 13.1 G/DL (ref 13–17.7)
INR PPP: 1.57 (ref 0.9–1.1)
LYMPHOCYTES # BLD MANUAL: 1.11 10*3/MM3 (ref 0.7–3.1)
LYMPHOCYTES NFR BLD MANUAL: 14.1 % (ref 19.6–45.3)
LYMPHOCYTES NFR BLD MANUAL: 6.1 % (ref 5–12)
MCH RBC QN AUTO: 32.3 PG (ref 26.6–33)
MCHC RBC AUTO-ENTMCNC: 34 G/DL (ref 31.5–35.7)
MCV RBC AUTO: 94.8 FL (ref 79–97)
MONOCYTES # BLD AUTO: 0.48 10*3/MM3 (ref 0.1–0.9)
NEUTROPHILS # BLD AUTO: 6.23 10*3/MM3 (ref 1.7–7)
NEUTROPHILS NFR BLD MANUAL: 78.8 % (ref 42.7–76)
PLAT MORPH BLD: NORMAL
PLATELET # BLD AUTO: 141 10*3/MM3 (ref 140–450)
PMV BLD AUTO: 10.8 FL (ref 6–12)
POTASSIUM SERPL-SCNC: 3.5 MMOL/L (ref 3.5–5.2)
PROT SERPL-MCNC: 6.1 G/DL (ref 6–8.5)
PROTHROMBIN TIME: 18.5 SECONDS (ref 11.7–14.2)
RBC # BLD AUTO: 4.06 10*6/MM3 (ref 4.14–5.8)
RBC MORPH BLD: NORMAL
SODIUM SERPL-SCNC: 137 MMOL/L (ref 136–145)
WBC # BLD AUTO: 7.9 10*3/MM3 (ref 3.4–10.8)
WBC MORPH BLD: NORMAL

## 2021-09-02 PROCEDURE — 85610 PROTHROMBIN TIME: CPT | Performed by: HOSPITALIST

## 2021-09-02 PROCEDURE — 25010000002 PIPERACILLIN SOD-TAZOBACTAM PER 1 G: Performed by: HOSPITALIST

## 2021-09-02 PROCEDURE — 25010000002 SODIUM CHLORIDE 0.9 % WITH KCL 20 MEQ 20-0.9 MEQ/L-% SOLUTION: Performed by: HOSPITALIST

## 2021-09-02 PROCEDURE — 85007 BL SMEAR W/DIFF WBC COUNT: CPT | Performed by: HOSPITALIST

## 2021-09-02 PROCEDURE — 85025 COMPLETE CBC W/AUTO DIFF WBC: CPT | Performed by: HOSPITALIST

## 2021-09-02 PROCEDURE — 82140 ASSAY OF AMMONIA: CPT | Performed by: HOSPITALIST

## 2021-09-02 PROCEDURE — 80053 COMPREHEN METABOLIC PANEL: CPT | Performed by: HOSPITALIST

## 2021-09-02 PROCEDURE — 99232 SBSQ HOSP IP/OBS MODERATE 35: CPT | Performed by: INTERNAL MEDICINE

## 2021-09-02 PROCEDURE — 25010000002 ONDANSETRON PER 1 MG: Performed by: HOSPITALIST

## 2021-09-02 RX ORDER — LACTULOSE 10 G/15ML
20 SOLUTION ORAL 3 TIMES DAILY
Status: DISCONTINUED | OUTPATIENT
Start: 2021-09-02 | End: 2021-09-03

## 2021-09-02 RX ORDER — AZITHROMYCIN 250 MG/1
250 TABLET, FILM COATED ORAL
Status: DISCONTINUED | OUTPATIENT
Start: 2021-09-02 | End: 2021-09-05

## 2021-09-02 RX ADMIN — SUCRALFATE 1 G: 1 TABLET ORAL at 18:02

## 2021-09-02 RX ADMIN — AZITHROMYCIN DIHYDRATE 250 MG: 250 TABLET, FILM COATED ORAL at 09:51

## 2021-09-02 RX ADMIN — LACTULOSE 20 G: 10 SOLUTION ORAL at 20:54

## 2021-09-02 RX ADMIN — ONDANSETRON 4 MG: 2 INJECTION INTRAMUSCULAR; INTRAVENOUS at 01:43

## 2021-09-02 RX ADMIN — POTASSIUM CHLORIDE AND SODIUM CHLORIDE 125 ML/HR: 900; 150 INJECTION, SOLUTION INTRAVENOUS at 01:43

## 2021-09-02 RX ADMIN — PANTOPRAZOLE SODIUM 40 MG: 40 TABLET, DELAYED RELEASE ORAL at 09:52

## 2021-09-02 RX ADMIN — LACTULOSE 20 G: 10 SOLUTION ORAL at 18:02

## 2021-09-02 RX ADMIN — SUCRALFATE 1 G: 1 TABLET ORAL at 09:51

## 2021-09-02 RX ADMIN — SUCRALFATE 1 G: 1 TABLET ORAL at 11:59

## 2021-09-02 RX ADMIN — PANTOPRAZOLE SODIUM 40 MG: 40 TABLET, DELAYED RELEASE ORAL at 18:02

## 2021-09-02 RX ADMIN — LACTULOSE 10 G: 10 SOLUTION ORAL at 09:51

## 2021-09-02 RX ADMIN — SUCRALFATE 1 G: 1 TABLET ORAL at 20:54

## 2021-09-02 RX ADMIN — POTASSIUM CHLORIDE AND SODIUM CHLORIDE 125 ML/HR: 900; 150 INJECTION, SOLUTION INTRAVENOUS at 11:16

## 2021-09-02 RX ADMIN — TAZOBACTAM SODIUM AND PIPERACILLIN SODIUM 3.38 G: 375; 3 INJECTION, SOLUTION INTRAVENOUS at 20:54

## 2021-09-02 RX ADMIN — TAZOBACTAM SODIUM AND PIPERACILLIN SODIUM 3.38 G: 375; 3 INJECTION, SOLUTION INTRAVENOUS at 11:16

## 2021-09-02 NOTE — PROGRESS NOTES
"[facility] [place]     Progress Note    Patient Name: Pascual Patel  : 1975  MRN: [id number]  Primary Care Physician:  Provider, No Known  Date of admission: 2021    Subjective   Subjective     Chief Complaint: liver toxicity    History of Present Illness  Patient Reports  Feeling better currently.   Some loose stools, good appetite.    Review of Systems   Gastrointestinal: Positive for nausea.   Neurological: Positive for weakness.       Objective   Objective     Vitals:   Temp:  [97.6 °F (36.4 °C)-98.5 °F (36.9 °C)] 97.9 °F (36.6 °C)  Heart Rate:  [61-68] 61  Resp:  [18] 18  BP: (122-147)/(78-95) 122/78    Physical Exam  Constitutional:       Appearance: He is ill-appearing.   HENT:      Head: Atraumatic.      Right Ear: External ear normal.      Left Ear: External ear normal.      Nose: Nose normal.      Mouth/Throat:      Pharynx: Oropharynx is clear.   Eyes:      General:         Right eye: Discharge present.   Cardiovascular:      Rate and Rhythm: Normal rate.      Pulses: Normal pulses.   Pulmonary:      Effort: Pulmonary effort is normal.   Abdominal:      General: Abdomen is flat. There is distension.   Neurological:      Mental Status: He is alert.   Psychiatric:         Mood and Affect: Mood normal.          Result Review    Result Review:  I have personally reviewed the results from the time of this admission to 2021 10:05 EDT and agree with these findings:  []  Laboratory  []  Microbiology  []  Radiology  []  EKG/Telemetry   []  Cardiology/Vascular   []  Pathology  []  Old records  []  Other:    Assessment/Plan   Assessment / Plan     Brief Patient Summary:  Pascual Patel is a 46 y.o. male   Tylenol hepatotoxicity improving  Coagulopathy  Encephalopathy stable   \"colitis\" on imaging   Diarrhea from lactulose  Positive blood culture       Active Hospital Problems:  Active Hospital Problems    Diagnosis    • Acetaminophen overdose      Plan: supportive care, will reduce " lactulose  Appears he is turning the corner hepatologically.  Appreciate Dr Cervantes input , we reviewed case.   Will follow.       DVT prophylaxis:  Mechanical DVT prophylaxis orders are present.    CODE STATUS:    Code Status: CPR  Medical Interventions (Level of Support Prior to Arrest): Full    Disposition:  I expect patient to be discharged uncertain.    Electronically signed by Shahbaz Can MD, 09/02/21, 10:05 AM EDT.

## 2021-09-02 NOTE — CASE MANAGEMENT/SOCIAL WORK
Continued Stay Note  [facility]     Patient Name: Pascual Patel  MRN: [id number]  Today's Date: 9/2/2021    Admit Date: 8/30/2021    Discharge Plan     Row Name 09/02/21 1706       Plan    Plan  Home    Plan Comments  S/W pt at bedside.  Pt confirms plans to return home upon DC.  CCP will continue to follow and assist as needed. ........sk        Discharge Codes    No documentation.       Expected Discharge Date and Time     Expected Discharge Date Expected Discharge Time    Sep 5, 2021             Dary Ang RN

## 2021-09-02 NOTE — PLAN OF CARE
Goal Outcome Evaluation:  Plan of Care Reviewed With: patient        Progress: improving  Outcome Summary: Pt states he feels better today, no c/o pain, some nausea intermittently tolerating regular diet. ammonia level increasing but LFT better. VSS

## 2021-09-02 NOTE — CONSULTS
CONSULT NOTE    Infectious Diseases - Keira Obrien MD  [facility]       Patient Identification:  Name: Pascual Patel  Age: 46 y.o.  Sex: male  :  1975  MRN: [id number]             Date of Consultation: 2021      Primary Care Physician: Provider, No Known                               Requesting Physician:   Reason for Consultation: Gram-negative bacteremia    Impression: Patient is a 46-year-old male who has been battling with poor dentition and painful teeth for the last few months.  Couple of months ago his right upper jaw teeth were painful and one of them was extracted by his dentist.  Patient does not recall taking any antibiotics.  About a week and a half ago patient started having increasing pain and discomfort in the left upper chest and was taking pain medications including Tylenol.  On 2021 patient was significantly unsteady with and took significant amount of Tylenol and afterwards may be dizzy and passed out multiple times and hit his face and head.  Is not sure whether he had a seizure or not but he had significant injury to his tongue.  He is complaining of significant dental pain.  Patient denies any diarrhea abdominal pain or recall taking any antibiotics.  Patient was noted to have low-grade temperature as well as elevated lactic acid level resulting in septic work-up.  Blood cultures were drawn and patient was placed on IV Zosyn.  Early morning today on 2021 blood cultures drawn at the time of admission on 2021, positive for 1 out of 2 bottles positive for gram-negative bacilli in the aerobic bottle resulting in infectious disease consultation.  Except for dental pain patient denies any significant discomfort anywhere else and overall feels much improved.  He was managed for Tylenol overdose and was noted to have significant LFT abnormalities which are improving.  CT scan of the abdomen and pelvis showed evidence of diffuse wall thickening of  the colon the patient did not have any clear symptoms of abdominal pain or diarrhea.  Earlier attempted to see the patient but he was in the bathroom so empirically based on the CT scan finding of colitis and gram-negative bacilli suspicion of bacteremia colitis was raised and empirically azithromycin was added earlier.  Given information we have regarding his dental issues and appearance of his teeth I think the source of bacteremia is likely of dental origin.  1-gram-negative bacteremic sepsis likely of dental origin with HACEK group of pathogens  2-poor dentition with possible dental abscess  3-status post Tylenol overdose with transaminitis  4-CT scan abnormality seen on the colon but with no clinical symptoms or signs to go with it significance of the CT scan finding unclear  5-other diagnosis per primary team.      Recommendations/Discussions:  It appears the patient improved and overall feeling better with supportive care and empiric antibiotic therapy which should cover the gram-negative rods isolated in the blood culture 3 days after his initial collection in the setting of ongoing dental issues.  Follow-up on repeat blood culture results and if repeat blood cultures are positive then low threshold to check 2D echo with Doppler.  Will defer to GI service about radiological findings in relation to his colon.  Duration of antibiotic treatment would recommend nature of the pathogen.  Thank you Dr. Weathers for letting me be the part of your patient care please see above impression and recommendations    History of Present Illness:   Patient is a 46-year-old male who has been battling with poor dentition and painful teeth for the last few months.  Couple of months ago his right upper jaw teeth were painful and one of them was extracted by his dentist.  Patient does not recall taking any antibiotics.  About a week and a half ago patient started having increasing pain and discomfort in the left upper chest and was  taking pain medications including Tylenol.  On 8/30/2021 patient was significantly unsteady with and took significant amount of Tylenol and afterwards may be dizzy and passed out multiple times and hit his face and head.  Is not sure whether he had a seizure or not but he had significant injury to his tongue.  He is complaining of significant dental pain.  Patient denies any diarrhea abdominal pain or recall taking any antibiotics.  Patient was noted to have low-grade temperature as well as elevated lactic acid level resulting in septic work-up.  Blood cultures were drawn and patient was placed on IV Zosyn.  Early morning today on 9/2/2021 blood cultures drawn at the time of admission on 8/30/2021, positive for 1 out of 2 bottles positive for gram-negative bacilli in the aerobic bottle resulting in infectious disease consultation.  Except for dental pain patient denies any significant discomfort anywhere else and overall feels much improved.  He was managed for Tylenol overdose and was noted to have significant LFT abnormalities which are improving.  CT scan of the abdomen and pelvis showed evidence of diffuse wall thickening of the colon the patient did not have any clear symptoms of abdominal pain or diarrhea.  Earlier attempted to see the patient but he was in the bathroom so empirically based on the CT scan finding of colitis and gram-negative bacilli suspicion of bacteremia colitis was raised and empirically azithromycin was added earlier.  Given information we have regarding his dental issues and appearance of his teeth I think the source of bacteremia is likely of dental origin.      Past Medical History:  History reviewed. No pertinent past medical history.  Past Surgical History:  Past Surgical History:   Procedure Laterality Date   • NOSE SURGERY     • TONSILLECTOMY        Home Meds:  No medications prior to admission.     Current Meds:     Current Facility-Administered Medications:   •  lactulose  "(CHRONULAC) 10 GM/15ML solution 10 g, 10 g, Oral, BID, Shabhaz Can MD, 10 g at 09/01/21 1946  •  ondansetron (ZOFRAN) injection 4 mg, 4 mg, Intravenous, Q4H PRN, Gopi Peralta MD, 4 mg at 09/02/21 0143  •  pantoprazole (PROTONIX) EC tablet 40 mg, 40 mg, Oral, BID AC, Gopi Peralta MD, 40 mg at 09/01/21 1821  •  piperacillin-tazobactam (ZOSYN) 3.375 g in iso-osmotic dextrose 50 ml (premix), 3.375 g, Intravenous, Q8H, Gopi Peralta MD, Currently Infusing at 09/02/21 0320  •  [COMPLETED] Insert peripheral IV, , , Once **AND** sodium chloride 0.9 % flush 10 mL, 10 mL, Intravenous, PRN, Edwin Hein MD  •  sodium chloride 0.9 % with KCl 20 mEq/L infusion, 125 mL/hr, Intravenous, Continuous, Gopi Peralta MD, Last Rate: 125 mL/hr at 09/02/21 0143, 125 mL/hr at 09/02/21 0143  •  sucralfate (CARAFATE) tablet 1 g, 1 g, Oral, 4x Daily AC & at Bedtime, Shahbaz Can MD, 1 g at 09/01/21 1946  Allergies:  No Known Allergies  Social History:   Social History     Tobacco Use   • Smoking status: Current Every Day Smoker     Packs/day: 0.50     Years: 20.00     Pack years: 10.00     Types: Cigarettes   • Smokeless tobacco: Never Used   Substance Use Topics   • Alcohol use: Not Currently      Family History:  Family History   Problem Relation Age of Onset   • Heart attack Father           Review of Systems  See history of present illness and past medical history.   Overall feeling much better neck supple dental issues which are improved he denies any other complaints.    Remainder of ROS is negative.      Vitals:   /85 (BP Location: Left arm, Patient Position: Lying)   Pulse 68   Temp 97.6 °F (36.4 °C) (Oral)   Resp 18   Ht 175.3 cm (69\")   Wt 119 kg (263 lb 1.6 oz)   SpO2 93%   BMI 38.85 kg/m²   I/O:     Intake/Output Summary (Last 24 hours) at 9/2/2021 0645  Last data filed at 9/2/2021 0300  Gross per 24 hour   Intake 2750 ml   Output --   Net 2750 ml     Exam:  Patient is examined using the personal " protective equipment as per guidelines from infection control for this particular patient as enacted.  Hand washing was performed before and after patient interaction.  General Appearance:    Alert, cooperative, no distress, appears stated age, nontoxic appearing male   Head:    Normocephalic, without obvious abnormality, atraumatic   Eyes:    PERRL, conjunctivae/corneas clear, EOM's intact, both eyes   Ears:    Normal external ear canals, both ears   Nose:   Nares normal, septum midline, mucosa normal, no drainage    or sinus tenderness   Throat:  Significant dental destruction and carious teeth with multiple missing teeth.   Neck:   Supple, symmetrical, trachea midline, no adenopathy;     thyroid:  no enlargement/tenderness/nodules; no carotid    bruit or JVD   Back:     Symmetric, no curvature, ROM normal, no CVA tenderness   Lungs:     Clear to auscultation bilaterally, respirations unlabored   Chest Wall:    No tenderness or deformity    Heart:    Regular rate and rhythm, S1 and S2 normal, no murmur, rub   or gallop   Abdomen:     Soft, non-tender, bowel sounds active all four quadrants,     no masses, no hepatomegaly, no splenomegaly   Extremities:   Extremities normal, atraumatic, no cyanosis or edema   Pulses:   Pulses palpable in all extremities; symmetric all extremities   Skin:   Skin color normal, Skin is warm and dry,  no rashes or palpable lesions   Neurologic:   CNII-XII intact, motor strength grossly intact, sensation grossly intact to light touch, no focal deficits noted       Data Review:    I reviewed the patient's new clinical results.  Results from last 7 days   Lab Units 09/02/21  0425 09/01/21 0530 08/31/21  0307 08/30/21  2044   WBC 10*3/mm3 7.90 9.48 16.85* 17.77*   HEMOGLOBIN g/dL 13.1 13.2 16.6 17.6   PLATELETS 10*3/mm3 141 139* 153 169     Results from last 7 days   Lab Units 09/02/21  0425 09/01/21  1209 09/01/21  0530 08/31/21  1202 08/31/21  0307 08/30/21  2044   SODIUM mmol/L 137 136  137 138 142 139   POTASSIUM mmol/L 3.5 3.4* 3.3* 3.7 4.2 4.2   CHLORIDE mmol/L 108* 106 106 104 106 103   CO2 mmol/L 20.9* 20.7* 20.5* 22.4 22.2 22.8   BUN mg/dL 9 12 13 16 14 15   CREATININE mg/dL 0.77 0.78 0.65* 1.04 0.96 1.08   CALCIUM mg/dL 8.3* 8.6 8.4* 8.2* 8.7 9.6   GLUCOSE mg/dL 77 100* 100* 105* 100* 108*       Culture data reviewed.  CT Head Without Contrast    Result Date: 8/31/2021  Electronically signed by Sony Vazquez MD on 08-31-21 at 0027    CT Abdomen Pelvis With Contrast    Result Date: 8/31/2021  Electronically signed by Sony Vazquez MD on 08-31-21 at 0031      Assessment:  Active Hospital Problems    Diagnosis  POA   • Acetaminophen overdose [T39.1X1A]  Yes      Resolved Hospital Problems   No resolved problems to display.         Plan:  See above.  Keira Cervantes MD   9/2/2021  06:45 EDT    Much of this encounter note is an electronic transcription/translation of spoken language to printed text. The electronic translation of spoken language may permit erroneous, or at times, nonsensical words or phrases to be inadvertently transcribed; Although I have reviewed the note for such errors, some may still exist

## 2021-09-02 NOTE — PROGRESS NOTES
Attempted to see the patient but he is in the bathroom and wants to be seen later.  Database reviewed detail counseled to follow-up discussed with Dr. Roldan.  Would recommend empiric Zithromax while continuing on Zosyn until the sensitivity and identity of the pathogen is the blood culture is available.  Agree with your plans for repeat blood cultures will follow on that.  Impression: Tentative  Gram-negative bacteremia in the setting of abnormal colon on the CT scan with  Abnormal LFTs with Tylenol overdose  Recommendations:  Would you like to send stool studies for gastrointestinal pathogen  PCR but since he is greater than 72 hours into the hospitalization [facility] is not excepting this order.  Start Zithromax and continue with Zosyn.

## 2021-09-02 NOTE — PROGRESS NOTES
"Daily progress note    Chief complaint  Doing better  No specific complaints  Denies any nausea vomiting diarrhea    History of present illness  46-year-old white male with no medical problem and no home medications presented to [facility] with nausea vomiting started yesterday early in the morning.  Patient apparently took a lot of Tylenol secondary to toothache.  Patient denies any suicidal homicidal ideation.  Patient denies any Tylenol abuse in the past.  Patient work-up in ER revealed Tylenol toxicity admit for management.      REVIEW OF SYSTEMS  Unremarkable     PHYSICAL EXAM   Blood pressure 133/97, pulse 67, temperature 98.3 °F (36.8 °C), temperature source Oral, resp. rate 18, height 175.3 cm (69\"), weight 119 kg (263 lb 1.6 oz), SpO2 96 %.    GENERAL: Awake and alert, nontoxic-appearing  HENT: nares patent  EYES: no scleral icterus  CV: regular rhythm, regular rate  RESPIRATORY: normal effort  ABDOMEN: soft, mild tenderness without rebound or guarding bowel sounds present  MUSCULOSKELETAL: no deformity  NEURO: alert, moves all extremities, follows commands  SKIN: warm, dry     LAB RESULTS  Lab Results (last 24 hours)     Procedure Component Value Units Date/Time    Manual Differential [[id number]]  (Abnormal) Collected: 09/02/21 0425    Specimen: Blood Updated: 09/02/21 0706     Neutrophil % 78.8 %      Lymphocyte % 14.1 %      Monocyte % 6.1 %      Eosinophil % 1.0 %      Neutrophils Absolute 6.23 10*3/mm3      Lymphocytes Absolute 1.11 10*3/mm3      Monocytes Absolute 0.48 10*3/mm3      Eosinophils Absolute 0.08 10*3/mm3      RBC Morphology Normal     WBC Morphology Normal     Platelet Morphology Normal    Ammonia [[id number]]  (Abnormal) Collected: 09/02/21 0425    Specimen: Blood Updated: 09/02/21 0703     Ammonia 70 umol/L     Blood Culture - Blood, Arm, Left [[id number]]  (Abnormal) Collected: 08/30/21 2154    Specimen: Blood from Arm, Left Updated: 09/02/21 0665     Blood Culture Gram " Negative Bacilli     Gram Stain Aerobic Bottle Gram negative bacilli    Comprehensive Metabolic Panel [[id number]]  (Abnormal) Collected: 09/02/21 0425    Specimen: Blood Updated: 09/02/21 0643     Glucose 77 mg/dL      BUN 9 mg/dL      Creatinine 0.77 mg/dL      Sodium 137 mmol/L      Potassium 3.5 mmol/L      Chloride 108 mmol/L      CO2 20.9 mmol/L      Calcium 8.3 mg/dL      Total Protein 6.1 g/dL      Albumin 3.30 g/dL      ALT (SGPT) 1,643 U/L      AST (SGOT) 636 U/L      Alkaline Phosphatase 63 U/L      Total Bilirubin 1.2 mg/dL      eGFR Non African Amer 109 mL/min/1.73      Globulin 2.8 gm/dL      A/G Ratio 1.2 g/dL      BUN/Creatinine Ratio 11.7     Anion Gap 8.1 mmol/L     Narrative:      GFR Normal >60  Chronic Kidney Disease <60  Kidney Failure <15      CBC & Differential [[id number]]  (Abnormal) Collected: 09/02/21 0425    Specimen: Blood Updated: 09/02/21 0611    Narrative:      The following orders were created for panel order CBC & Differential.  Procedure                               Abnormality         Status                     ---------                               -----------         ------                     CBC Auto Differential[[id number]]        Abnormal            Final result                 Please view results for these tests on the individual orders.    CBC Auto Differential [[id number]]  (Abnormal) Collected: 09/02/21 0425    Specimen: Blood Updated: 09/02/21 0611     WBC 7.90 10*3/mm3      RBC 4.06 10*6/mm3      Hemoglobin 13.1 g/dL      Hematocrit 38.5 %      MCV 94.8 fL      MCH 32.3 pg      MCHC 34.0 g/dL      RDW 12.8 %      RDW-SD 44.0 fl      MPV 10.8 fL      Platelets 141 10*3/mm3     Protime-INR [[id number]]  (Abnormal) Collected: 09/02/21 0425    Specimen: Blood Updated: 09/02/21 0536     Protime 18.5 Seconds      INR 1.57    Blood Culture - Blood, Arm, Left [[id number]] Collected: 08/30/21 2154    Specimen: Blood from Arm, Left Updated: 09/01/21 2215     Blood Culture No growth  at 2 days    Blood Culture - Blood, Hand, Right [[id number]] Collected: 09/01/21 1932    Specimen: Blood from Hand, Right Updated: 09/01/21 1940    Blood Culture - Blood, Hand, Left [[id number]] Collected: 09/01/21 1925    Specimen: Blood from Hand, Left Updated: 09/01/21 1940        Imaging Results (Last 24 Hours)     ** No results found for the last 24 hours. **        ECG 12 Lead  Component   Ref Range & Units 8/30/21 2052   QT Interval   ms 331         HEART RATE= 91  bpm  RR Interval= 660  ms  KY Interval= 147  ms  P Horizontal Axis= -19  deg  P Front Axis= 50  deg  QRSD Interval= 93  ms  QT Interval= 331  ms  QRS Axis= 25  deg  T Wave Axis= 25  deg  - BORDERLINE ECG -  Sinus rhythm  Probable left atrial enlargement  NO PRIOR TRACING AVAILABLE FOR COMPARISON             Current Facility-Administered Medications:   •  azithromycin (ZITHROMAX) tablet 250 mg, 250 mg, Oral, Q24H, Keira Cervantes MD, 250 mg at 09/02/21 0951  •  lactulose (CHRONULAC) 10 GM/15ML solution 10 g, 10 g, Oral, BID, Shahbaz Can MD, 10 g at 09/02/21 0951  •  ondansetron (ZOFRAN) injection 4 mg, 4 mg, Intravenous, Q4H PRN, Gopi Peralta MD, 4 mg at 09/02/21 0143  •  pantoprazole (PROTONIX) EC tablet 40 mg, 40 mg, Oral, BID AC, Gopi Peralta MD, 40 mg at 09/02/21 0952  •  piperacillin-tazobactam (ZOSYN) 3.375 g in iso-osmotic dextrose 50 ml (premix), 3.375 g, Intravenous, Q8H, Gopi Peralta MD, 3.375 g at 09/02/21 1116  •  [COMPLETED] Insert peripheral IV, , , Once **AND** sodium chloride 0.9 % flush 10 mL, 10 mL, Intravenous, PRN, Edwin Hein MD  •  sodium chloride 0.9 % with KCl 20 mEq/L infusion, 125 mL/hr, Intravenous, Continuous, Gopi Peralta MD, Last Rate: 125 mL/hr at 09/02/21 1116, 125 mL/hr at 09/02/21 1116  •  sucralfate (CARAFATE) tablet 1 g, 1 g, Oral, 4x Daily AC & at Bedtime, Shahbaz Can MD, 1 g at 09/02/21 1159     ASSESSMENT  GNB bacteremia and sepsis  Tylenol toxicity  Elevated LFTs improving  Diffuse wall  thickening of the colon    PLAN  CPM  S/P Acetodate  IVF  Continue IV antibiotics  Infectious disease consult appreciated  GI to follow patient  Symptomatic treatment for pain nausea vomiting  Supportive care  Discussed with nursing staff  Follow closely with recommendation current hospital course    BERNADETTE CELESTE MD

## 2021-09-03 ENCOUNTER — APPOINTMENT (OUTPATIENT)
Dept: GENERAL RADIOLOGY | Facility: HOSPITAL | Age: 46
End: 2021-09-03

## 2021-09-03 ENCOUNTER — APPOINTMENT (OUTPATIENT)
Dept: CARDIOLOGY | Facility: HOSPITAL | Age: 46
End: 2021-09-03

## 2021-09-03 LAB
ALBUMIN SERPL-MCNC: 3.4 G/DL (ref 3.5–5.2)
ALBUMIN SERPL-MCNC: 3.4 G/DL (ref 3.5–5.2)
ALBUMIN/GLOB SERPL: 1.1 G/DL
ALBUMIN/GLOB SERPL: 1.2 G/DL
ALP SERPL-CCNC: 70 U/L (ref 39–117)
ALP SERPL-CCNC: 71 U/L (ref 39–117)
ALT SERPL W P-5'-P-CCNC: 1046 U/L (ref 1–41)
ALT SERPL W P-5'-P-CCNC: 1138 U/L (ref 1–41)
AMMONIA BLD-SCNC: 73 UMOL/L (ref 16–60)
ANION GAP SERPL CALCULATED.3IONS-SCNC: 10.9 MMOL/L (ref 5–15)
ANION GAP SERPL CALCULATED.3IONS-SCNC: 9.7 MMOL/L (ref 5–15)
AST SERPL-CCNC: 201 U/L (ref 1–40)
AST SERPL-CCNC: 257 U/L (ref 1–40)
BACTERIA SPEC AEROBE CULT: ABNORMAL
BASOPHILS # BLD AUTO: 0.05 10*3/MM3 (ref 0–0.2)
BASOPHILS NFR BLD AUTO: 0.7 % (ref 0–1.5)
BH CV ECHO MEAS - ACS: 2.2 CM
BH CV ECHO MEAS - AO MAX PG (FULL): 1.2 MMHG
BH CV ECHO MEAS - AO MAX PG: 8.9 MMHG
BH CV ECHO MEAS - AO MEAN PG (FULL): 1 MMHG
BH CV ECHO MEAS - AO MEAN PG: 5 MMHG
BH CV ECHO MEAS - AO ROOT AREA (BSA CORRECTED): 1.3
BH CV ECHO MEAS - AO ROOT AREA: 7.5 CM^2
BH CV ECHO MEAS - AO ROOT DIAM: 3.1 CM
BH CV ECHO MEAS - AO V2 MAX: 149 CM/SEC
BH CV ECHO MEAS - AO V2 MEAN: 109 CM/SEC
BH CV ECHO MEAS - AO V2 VTI: 36.2 CM
BH CV ECHO MEAS - AVA(I,A): 2.6 CM^2
BH CV ECHO MEAS - AVA(I,D): 2.6 CM^2
BH CV ECHO MEAS - AVA(V,A): 2.9 CM^2
BH CV ECHO MEAS - AVA(V,D): 2.9 CM^2
BH CV ECHO MEAS - BSA(HAYCOCK): 2.5 M^2
BH CV ECHO MEAS - BSA: 2.3 M^2
BH CV ECHO MEAS - BZI_BMI: 39 KILOGRAMS/M^2
BH CV ECHO MEAS - BZI_METRIC_HEIGHT: 175.3 CM
BH CV ECHO MEAS - BZI_METRIC_WEIGHT: 119.8 KG
BH CV ECHO MEAS - EDV(CUBED): 110.6 ML
BH CV ECHO MEAS - EDV(MOD-SP2): 72 ML
BH CV ECHO MEAS - EDV(MOD-SP4): 76 ML
BH CV ECHO MEAS - EDV(TEICH): 107.5 ML
BH CV ECHO MEAS - EF(CUBED): 70.4 %
BH CV ECHO MEAS - EF(MOD-BP): 64 %
BH CV ECHO MEAS - EF(MOD-SP2): 61.1 %
BH CV ECHO MEAS - EF(MOD-SP4): 64.5 %
BH CV ECHO MEAS - EF(TEICH): 61.9 %
BH CV ECHO MEAS - ESV(CUBED): 32.8 ML
BH CV ECHO MEAS - ESV(MOD-SP2): 28 ML
BH CV ECHO MEAS - ESV(MOD-SP4): 27 ML
BH CV ECHO MEAS - ESV(TEICH): 41 ML
BH CV ECHO MEAS - FS: 33.3 %
BH CV ECHO MEAS - IVS/LVPW: 0.85
BH CV ECHO MEAS - IVSD: 1.1 CM
BH CV ECHO MEAS - LA DIMENSION: 4.4 CM
BH CV ECHO MEAS - LA/AO: 1.4
BH CV ECHO MEAS - LAT PEAK E' VEL: 10.6 CM/SEC
BH CV ECHO MEAS - LV DIASTOLIC VOL/BSA (35-75): 32.7 ML/M^2
BH CV ECHO MEAS - LV MASS(C)D: 219.1 GRAMS
BH CV ECHO MEAS - LV MASS(C)DI: 94.3 GRAMS/M^2
BH CV ECHO MEAS - LV MAX PG: 7.7 MMHG
BH CV ECHO MEAS - LV MEAN PG: 4 MMHG
BH CV ECHO MEAS - LV SYSTOLIC VOL/BSA (12-30): 11.6 ML/M^2
BH CV ECHO MEAS - LV V1 MAX: 139 CM/SEC
BH CV ECHO MEAS - LV V1 MEAN: 93.9 CM/SEC
BH CV ECHO MEAS - LV V1 VTI: 30.2 CM
BH CV ECHO MEAS - LVIDD: 4.8 CM
BH CV ECHO MEAS - LVIDS: 3.2 CM
BH CV ECHO MEAS - LVLD AP2: 7.9 CM
BH CV ECHO MEAS - LVLD AP4: 7 CM
BH CV ECHO MEAS - LVLS AP2: 5.7 CM
BH CV ECHO MEAS - LVLS AP4: 5.4 CM
BH CV ECHO MEAS - LVOT AREA (M): 3.1 CM^2
BH CV ECHO MEAS - LVOT AREA: 3.1 CM^2
BH CV ECHO MEAS - LVOT DIAM: 2 CM
BH CV ECHO MEAS - LVPWD: 1.3 CM
BH CV ECHO MEAS - MED PEAK E' VEL: 7.7 CM/SEC
BH CV ECHO MEAS - MV A MAX VEL: 60 CM/SEC
BH CV ECHO MEAS - MV DEC SLOPE: 575 CM/SEC^2
BH CV ECHO MEAS - MV DEC TIME: 0.16 SEC
BH CV ECHO MEAS - MV E MAX VEL: 101 CM/SEC
BH CV ECHO MEAS - MV E/A: 1.7
BH CV ECHO MEAS - MV MAX PG: 4.8 MMHG
BH CV ECHO MEAS - MV MEAN PG: 2 MMHG
BH CV ECHO MEAS - MV P1/2T MAX VEL: 113 CM/SEC
BH CV ECHO MEAS - MV P1/2T: 57.6 MSEC
BH CV ECHO MEAS - MV V2 MAX: 110 CM/SEC
BH CV ECHO MEAS - MV V2 MEAN: 54.7 CM/SEC
BH CV ECHO MEAS - MV V2 VTI: 32.5 CM
BH CV ECHO MEAS - MVA P1/2T LCG: 1.9 CM^2
BH CV ECHO MEAS - MVA(P1/2T): 3.8 CM^2
BH CV ECHO MEAS - MVA(VTI): 2.9 CM^2
BH CV ECHO MEAS - PA ACC TIME: 0.15 SEC
BH CV ECHO MEAS - PA MAX PG (FULL): 1.7 MMHG
BH CV ECHO MEAS - PA MAX PG: 4.9 MMHG
BH CV ECHO MEAS - PA PR(ACCEL): 11.1 MMHG
BH CV ECHO MEAS - PA V2 MAX: 111 CM/SEC
BH CV ECHO MEAS - PVA(V,A): 4.3 CM^2
BH CV ECHO MEAS - PVA(V,D): 4.3 CM^2
BH CV ECHO MEAS - QP/QS: 1.3
BH CV ECHO MEAS - RAP SYSTOLE: 3 MMHG
BH CV ECHO MEAS - RV MAX PG: 3.2 MMHG
BH CV ECHO MEAS - RV MEAN PG: 2 MMHG
BH CV ECHO MEAS - RV V1 MAX: 89.4 CM/SEC
BH CV ECHO MEAS - RV V1 MEAN: 67.5 CM/SEC
BH CV ECHO MEAS - RV V1 VTI: 23.3 CM
BH CV ECHO MEAS - RVOT AREA: 5.3 CM^2
BH CV ECHO MEAS - RVOT DIAM: 2.6 CM
BH CV ECHO MEAS - RVSP: 31.1 MMHG
BH CV ECHO MEAS - SI(AO): 117.5 ML/M^2
BH CV ECHO MEAS - SI(CUBED): 33.5 ML/M^2
BH CV ECHO MEAS - SI(LVOT): 40.8 ML/M^2
BH CV ECHO MEAS - SI(MOD-SP2): 18.9 ML/M^2
BH CV ECHO MEAS - SI(MOD-SP4): 21.1 ML/M^2
BH CV ECHO MEAS - SI(TEICH): 28.6 ML/M^2
BH CV ECHO MEAS - SV(AO): 273.2 ML
BH CV ECHO MEAS - SV(CUBED): 77.8 ML
BH CV ECHO MEAS - SV(LVOT): 94.9 ML
BH CV ECHO MEAS - SV(MOD-SP2): 44 ML
BH CV ECHO MEAS - SV(MOD-SP4): 49 ML
BH CV ECHO MEAS - SV(RVOT): 123.7 ML
BH CV ECHO MEAS - SV(TEICH): 66.6 ML
BH CV ECHO MEAS - TAPSE (>1.6): 2.9 CM
BH CV ECHO MEAS - TR MAX VEL: 265 CM/SEC
BH CV ECHO MEASUREMENTS AVERAGE E/E' RATIO: 11.04
BH CV XLRA - RV BASE: 3.1 CM
BH CV XLRA - RV LENGTH: 6.8 CM
BH CV XLRA - RV MID: 2.4 CM
BH CV XLRA - TDI S': 15.7 CM/SEC
BILIRUB SERPL-MCNC: 0.8 MG/DL (ref 0–1.2)
BILIRUB SERPL-MCNC: 0.8 MG/DL (ref 0–1.2)
BUN SERPL-MCNC: 8 MG/DL (ref 6–20)
BUN SERPL-MCNC: 8 MG/DL (ref 6–20)
BUN/CREAT SERPL: 9.1 (ref 7–25)
BUN/CREAT SERPL: 9.6 (ref 7–25)
CALCIUM SPEC-SCNC: 8.5 MG/DL (ref 8.6–10.5)
CALCIUM SPEC-SCNC: 8.6 MG/DL (ref 8.6–10.5)
CHLORIDE SERPL-SCNC: 106 MMOL/L (ref 98–107)
CHLORIDE SERPL-SCNC: 108 MMOL/L (ref 98–107)
CK SERPL-CCNC: 50 U/L (ref 20–200)
CO2 SERPL-SCNC: 20.3 MMOL/L (ref 22–29)
CO2 SERPL-SCNC: 21.1 MMOL/L (ref 22–29)
CREAT SERPL-MCNC: 0.83 MG/DL (ref 0.76–1.27)
CREAT SERPL-MCNC: 0.88 MG/DL (ref 0.76–1.27)
DEPRECATED RDW RBC AUTO: 43 FL (ref 37–54)
EOSINOPHIL # BLD AUTO: 0.42 10*3/MM3 (ref 0–0.4)
EOSINOPHIL NFR BLD AUTO: 6.1 % (ref 0.3–6.2)
ERYTHROCYTE [DISTWIDTH] IN BLOOD BY AUTOMATED COUNT: 13 % (ref 12.3–15.4)
GFR SERPL CREATININE-BSD FRML MDRD: 100 ML/MIN/1.73
GFR SERPL CREATININE-BSD FRML MDRD: 94 ML/MIN/1.73
GLOBULIN UR ELPH-MCNC: 2.8 GM/DL
GLOBULIN UR ELPH-MCNC: 3.1 GM/DL
GLUCOSE SERPL-MCNC: 114 MG/DL (ref 65–99)
GLUCOSE SERPL-MCNC: 89 MG/DL (ref 65–99)
GRAM STN SPEC: ABNORMAL
HCT VFR BLD AUTO: 36.5 % (ref 37.5–51)
HGB BLD-MCNC: 12.8 G/DL (ref 13–17.7)
IMM GRANULOCYTES # BLD AUTO: 0.08 10*3/MM3 (ref 0–0.05)
IMM GRANULOCYTES NFR BLD AUTO: 1.2 % (ref 0–0.5)
INR PPP: 1.3 (ref 0.9–1.1)
INR PPP: 1.56 (ref 0.9–1.1)
LDH SERPL-CCNC: 281 U/L (ref 135–225)
LEFT ATRIUM VOLUME INDEX: 28.3 ML/M2
LV EF 2D ECHO EST: 65 %
LYMPHOCYTES # BLD AUTO: 2.28 10*3/MM3 (ref 0.7–3.1)
LYMPHOCYTES NFR BLD AUTO: 32.9 % (ref 19.6–45.3)
MCH RBC QN AUTO: 32.3 PG (ref 26.6–33)
MCHC RBC AUTO-ENTMCNC: 35.1 G/DL (ref 31.5–35.7)
MCV RBC AUTO: 92.2 FL (ref 79–97)
MONOCYTES # BLD AUTO: 0.57 10*3/MM3 (ref 0.1–0.9)
MONOCYTES NFR BLD AUTO: 8.2 % (ref 5–12)
NEUTROPHILS NFR BLD AUTO: 3.53 10*3/MM3 (ref 1.7–7)
NEUTROPHILS NFR BLD AUTO: 50.9 % (ref 42.7–76)
NRBC BLD AUTO-RTO: 0.1 /100 WBC (ref 0–0.2)
PLATELET # BLD AUTO: 158 10*3/MM3 (ref 140–450)
PMV BLD AUTO: 10.7 FL (ref 6–12)
POTASSIUM SERPL-SCNC: 3.7 MMOL/L (ref 3.5–5.2)
POTASSIUM SERPL-SCNC: 3.8 MMOL/L (ref 3.5–5.2)
PROT SERPL-MCNC: 6.2 G/DL (ref 6–8.5)
PROT SERPL-MCNC: 6.5 G/DL (ref 6–8.5)
PROTHROMBIN TIME: 16 SECONDS (ref 11.7–14.2)
PROTHROMBIN TIME: 18.4 SECONDS (ref 11.7–14.2)
RBC # BLD AUTO: 3.96 10*6/MM3 (ref 4.14–5.8)
SINUS: 3 CM
SODIUM SERPL-SCNC: 136 MMOL/L (ref 136–145)
SODIUM SERPL-SCNC: 140 MMOL/L (ref 136–145)
STJ: 3.2 CM
WBC # BLD AUTO: 6.93 10*3/MM3 (ref 3.4–10.8)

## 2021-09-03 PROCEDURE — 82550 ASSAY OF CK (CPK): CPT | Performed by: INTERNAL MEDICINE

## 2021-09-03 PROCEDURE — 70355 PANORAMIC X-RAY OF JAWS: CPT

## 2021-09-03 PROCEDURE — 80053 COMPREHEN METABOLIC PANEL: CPT | Performed by: HOSPITALIST

## 2021-09-03 PROCEDURE — 83615 LACTATE (LD) (LDH) ENZYME: CPT | Performed by: INTERNAL MEDICINE

## 2021-09-03 PROCEDURE — 82140 ASSAY OF AMMONIA: CPT | Performed by: HOSPITALIST

## 2021-09-03 PROCEDURE — 85610 PROTHROMBIN TIME: CPT | Performed by: INTERNAL MEDICINE

## 2021-09-03 PROCEDURE — 93306 TTE W/DOPPLER COMPLETE: CPT

## 2021-09-03 PROCEDURE — 85025 COMPLETE CBC W/AUTO DIFF WBC: CPT | Performed by: HOSPITALIST

## 2021-09-03 PROCEDURE — 80053 COMPREHEN METABOLIC PANEL: CPT | Performed by: INTERNAL MEDICINE

## 2021-09-03 PROCEDURE — 93306 TTE W/DOPPLER COMPLETE: CPT | Performed by: INTERNAL MEDICINE

## 2021-09-03 PROCEDURE — 85610 PROTHROMBIN TIME: CPT | Performed by: HOSPITALIST

## 2021-09-03 PROCEDURE — 25010000002 SODIUM CHLORIDE 0.9 % WITH KCL 20 MEQ 20-0.9 MEQ/L-% SOLUTION: Performed by: HOSPITALIST

## 2021-09-03 PROCEDURE — 25010000002 PIPERACILLIN SOD-TAZOBACTAM PER 1 G: Performed by: HOSPITALIST

## 2021-09-03 PROCEDURE — 99232 SBSQ HOSP IP/OBS MODERATE 35: CPT | Performed by: INTERNAL MEDICINE

## 2021-09-03 RX ORDER — LACTULOSE 10 G/15ML
30 SOLUTION ORAL 3 TIMES DAILY
Status: DISCONTINUED | OUTPATIENT
Start: 2021-09-03 | End: 2021-09-04

## 2021-09-03 RX ADMIN — TAZOBACTAM SODIUM AND PIPERACILLIN SODIUM 3.38 G: 375; 3 INJECTION, SOLUTION INTRAVENOUS at 03:09

## 2021-09-03 RX ADMIN — SODIUM CHLORIDE, PRESERVATIVE FREE 10 ML: 5 INJECTION INTRAVENOUS at 08:21

## 2021-09-03 RX ADMIN — LACTULOSE 30 G: 10 SOLUTION ORAL at 16:22

## 2021-09-03 RX ADMIN — POTASSIUM CHLORIDE AND SODIUM CHLORIDE 125 ML/HR: 900; 150 INJECTION, SOLUTION INTRAVENOUS at 16:22

## 2021-09-03 RX ADMIN — LACTULOSE 30 G: 10 SOLUTION ORAL at 21:40

## 2021-09-03 RX ADMIN — POTASSIUM CHLORIDE AND SODIUM CHLORIDE 75 ML/HR: 900; 150 INJECTION, SOLUTION INTRAVENOUS at 03:10

## 2021-09-03 RX ADMIN — LACTULOSE 20 G: 10 SOLUTION ORAL at 08:21

## 2021-09-03 RX ADMIN — SUCRALFATE 1 G: 1 TABLET ORAL at 21:40

## 2021-09-03 RX ADMIN — AZITHROMYCIN DIHYDRATE 250 MG: 250 TABLET, FILM COATED ORAL at 08:21

## 2021-09-03 RX ADMIN — PANTOPRAZOLE SODIUM 40 MG: 40 TABLET, DELAYED RELEASE ORAL at 16:23

## 2021-09-03 RX ADMIN — TAZOBACTAM SODIUM AND PIPERACILLIN SODIUM 3.38 G: 375; 3 INJECTION, SOLUTION INTRAVENOUS at 19:24

## 2021-09-03 RX ADMIN — PANTOPRAZOLE SODIUM 40 MG: 40 TABLET, DELAYED RELEASE ORAL at 08:21

## 2021-09-03 RX ADMIN — SUCRALFATE 1 G: 1 TABLET ORAL at 16:22

## 2021-09-03 RX ADMIN — SUCRALFATE 1 G: 1 TABLET ORAL at 08:21

## 2021-09-03 RX ADMIN — TAZOBACTAM SODIUM AND PIPERACILLIN SODIUM 3.38 G: 375; 3 INJECTION, SOLUTION INTRAVENOUS at 11:12

## 2021-09-03 RX ADMIN — SUCRALFATE 1 G: 1 TABLET ORAL at 11:12

## 2021-09-03 NOTE — PLAN OF CARE
Goal Outcome Evaluation:  Plan of Care Reviewed With: patient        Progress: improving  Outcome Summary: Labs improved today except ammonia level but patient remains oriented. No c/o today. xrays of mouth and echo completed today. sensitivity resulted, plan on discharge in a few days per MD.

## 2021-09-03 NOTE — PROGRESS NOTES
"  Infectious Diseases Progress Note    Keira Cervantes MD     [facility]  [place]: 4 days  Patient Identification:  Name: Pascual Patel  Age: 46 y.o.  Sex: male  :  1975  MRN: [id number]         Primary Care Physician: Provider, No Known            Subjective: Feeling better and denies any specific complaints.  Interval History: See consultation note.    Objective:    Scheduled Meds:azithromycin, 250 mg, Oral, Q24H  lactulose, 20 g, Oral, TID  pantoprazole, 40 mg, Oral, BID AC  piperacillin-tazobactam, 3.375 g, Intravenous, Q8H  sucralfate, 1 g, Oral, 4x Daily AC & at Bedtime      Continuous Infusions:sodium chloride 0.9 % with KCl 20 mEq, 75 mL/hr, Last Rate: 75 mL/hr (21 0310)        Vital signs in last 24 hours:  Temp:  [98.1 °F (36.7 °C)-98.4 °F (36.9 °C)] 98.2 °F (36.8 °C)  Heart Rate:  [60-67] 60  Resp:  [16-18] 16  BP: (133-153)/(88-99) 135/89    Intake/Output:    Intake/Output Summary (Last 24 hours) at 9/3/2021 1058  Last data filed at 9/3/2021 0700  Gross per 24 hour   Intake 2783.33 ml   Output --   Net 2783.33 ml       Exam:  /89 (BP Location: Right arm, Patient Position: Lying)   Pulse 60   Temp 98.2 °F (36.8 °C) (Oral)   Resp 16   Ht 175.3 cm (69\")   Wt 120 kg (264 lb)   SpO2 95%   BMI 38.99 kg/m²   Patient is examined using the personal protective equipment as per guidelines from infection control for this particular patient as enacted.  Hand washing was performed before and after patient interaction.  General Appearance:    Alert, cooperative, no distress, AAOx3                          Head:    Normocephalic, without obvious abnormality, atraumatic                           Eyes:    PERRL, conjunctivae/corneas clear, EOM's intact, both eyes                         Throat:   Lips, tongue, gums normal; oral mucosa pink and moist                           Neck:   Supple, symmetrical, trachea midline, no JVD                         Lungs:    Clear to auscultation " bilaterally, respirations unlabored                 Chest Wall:    No tenderness or deformity                          Heart:    Regular rate and rhythm, S1 and S2 normal, no murmur, no rub                                         or gallop                  Abdomen:     Soft, non-tender, bowel sounds active, no masses, no                                                        organomegaly                  Extremities:   Extremities normal, atraumatic, no cyanosis or edema                        Pulses:   Pulses palpable in all extremities                            Skin:   Skin is warm and dry,  no rashes or palpable lesions                  Neurologic:   CNII-XII intact, motor strength grossly intact, sensation grossly                                         intact to light touch, no focal deficits noted       Data Review:    I reviewed the patient's new clinical results.  Results from last 7 days   Lab Units 09/03/21  0521 09/02/21  0425 09/01/21  0530 08/31/21  0307 08/30/21  2044   WBC 10*3/mm3 6.93 7.90 9.48 16.85* 17.77*   HEMOGLOBIN g/dL 12.8* 13.1 13.2 16.6 17.6   PLATELETS 10*3/mm3 158 141 139* 153 169     Results from last 7 days   Lab Units 09/03/21  0521 09/02/21  0425 09/01/21  1209 09/01/21  0530 08/31/21  1202 08/31/21  0307 08/30/21  2044   SODIUM mmol/L 136 137 136 137 138 142 139   POTASSIUM mmol/L 3.7 3.5 3.4* 3.3* 3.7 4.2 4.2   CHLORIDE mmol/L 106 108* 106 106 104 106 103   CO2 mmol/L 20.3* 20.9* 20.7* 20.5* 22.4 22.2 22.8   BUN mg/dL 8 9 12 13 16 14 15   CREATININE mg/dL 0.83 0.77 0.78 0.65* 1.04 0.96 1.08   CALCIUM mg/dL 8.6 8.3* 8.6 8.4* 8.2* 8.7 9.6   GLUCOSE mg/dL 89 77 100* 100* 105* 100* 108*     Microbiology Results (last 10 days)     Procedure Component Value - Date/Time    Blood Culture - Blood, Hand, Right [[id number]] Collected: 09/01/21 1932    Lab Status: Preliminary result Specimen: Blood from Hand, Right Updated: 09/02/21 1945     Blood Culture No growth at 24 hours    Blood Culture  - Blood, Hand, Left [[id number]] Collected: 09/01/21 1925    Lab Status: Preliminary result Specimen: Blood from Hand, Left Updated: 09/02/21 1945     Blood Culture No growth at 24 hours    COVID PRE-OP / PRE-PROCEDURE SCREENING ORDER (NO ISOLATION) - Swab, Nasopharynx [[id number]]  (Normal) Collected: 08/30/21 2155    Lab Status: Final result Specimen: Swab from Nasopharynx Updated: 08/31/21 0002    Narrative:      The following orders were created for panel order COVID PRE-OP / PRE-PROCEDURE SCREENING ORDER (NO ISOLATION) - Swab, Nasopharynx.  Procedure                               Abnormality         Status                     ---------                               -----------         ------                     COVID-19,[facility] IN-HOUSE...[[id number]]  Normal              Final result                 Please view results for these tests on the individual orders.    COVID-19,[facility] IN-HOU[facility] CEPHEID/LINWOOD NP SWAB IN TRANSPORT MEDIA 8-12 HR TAT - Swab, Nasopharynx [[id number]]  (Normal) Collected: 08/30/21 2155    Lab Status: Final result Specimen: Swab from Nasopharynx Updated: 08/31/21 0002     COVID19 Not Detected    Narrative:      Fact sheet for providers: https://www.fda.gov/media/463297/download     Fact sheet for patients: https://www.fda.gov/media/129813/download    Blood Culture - Blood, Arm, Left [[id number]]  (Abnormal)  (Susceptibility) Collected: 08/30/21 2154    Lab Status: Final result Specimen: Blood from Arm, Left Updated: 09/03/21 0640     Blood Culture Brevundimonas diminuta / vesicularis     Gram Stain Aerobic Bottle Gram negative bacilli    Susceptibility      Brevundimonas diminuta / vesicularis      OLIVA      Cefepime Susceptible      Ceftazidime Resistant      Gentamicin Susceptible      Levofloxacin Resistant      Meropenem Susceptible      Piperacillin + Tazobactam Susceptible      Tetracycline Susceptible               Linear View                   Blood Culture - Blood, Arm, Left [[id number]]  Collected: 08/30/21 2154    Lab Status: Preliminary result Specimen: Blood from Arm, Left Updated: 09/02/21 2215     Blood Culture No growth at 3 days    Blood Culture ID, PCR - Blood, Arm, Left [[id number]]  (Normal) Collected: 08/30/21 2154    Lab Status: Final result Specimen: Blood from Arm, Left Updated: 09/01/21 1437     BCID, PCR Negative by BCID PCR. Culture to Follow.              Assessment:  1-gram-negative bacterem 1 out of 2 with Brevundimonas  2-poor dentition with possible dental abscess  3-status post Tylenol overdose with transaminitis  4-CT scan abnormality seen on the colon but with no clinical symptoms or signs to go with it significance of the CT scan finding unclear  5-other diagnosis per primary team.        Recommendations/Discussions:  · Presence of Brevundimonas diminuta / vesicularis in the blood culture is likely contaminant during the process of collection or issue during the processing of his blood culture sample in the microbiology department as this pathogen does not reflect his underlying infectious process which is odontogenic infection.  · Follow-up on repeat blood culture results and if repeat blood cultures are positive then low threshold to check 2D echo with Doppler.    · With echocardiogram is okay then patient can be switched to oral Augmentin for couple of weeks with outpatient follow-up with the oral maxillofacial surgery service or dentist to address his infected decaying teeth.    Keira Cervantes MD  9/3/2021  10:58 EDT    Much of this encounter note is an electronic transcription/translation of spoken language to printed text. The electronic translation of spoken language may permit erroneous, or at times, nonsensical words or phrases to be inadvertently transcribed; Although I have reviewed the note for such errors, some may still exist

## 2021-09-03 NOTE — PROGRESS NOTES
[facility]     Progress Note    Patient Name: Pascual Patel  : 1975  MRN: [id number]  Primary Care Physician:  Provider, No Known  Date of admission: 2021    Subjective   Subjective     Chief Complaint:    History of Present Illness  Patient Reports feeling better, sore tooth left upper jaw in the back, alert oriented    Review of Systems   HENT:        Sore tooth    Gastrointestinal: Positive for nausea.   Neurological: Positive for weakness.       Objective   Objective     Vitals:   Temp:  [98.1 °F (36.7 °C)-98.4 °F (36.9 °C)] 98.2 °F (36.8 °C)  Heart Rate:  [60-67] 60  Resp:  [16-18] 16  BP: (133-153)/(88-99) 135/89    Physical Exam  HENT:      Head: Atraumatic.      Right Ear: External ear normal.      Left Ear: External ear normal.      Nose: Nose normal.      Mouth/Throat:      Mouth: Mucous membranes are moist.   Eyes:      Pupils: Pupils are equal, round, and reactive to light.   Cardiovascular:      Rate and Rhythm: Normal rate.      Pulses: Normal pulses.   Pulmonary:      Effort: Pulmonary effort is normal.   Abdominal:      General: Abdomen is flat.   Skin:     General: Skin is warm and dry.   Neurological:      Mental Status: He is alert.   Psychiatric:         Mood and Affect: Mood normal.          Result Review    Result Review:  I have personally reviewed the results from the time of this admission to 9/3/2021 10:56 EDT and agree with these findings:  [x]  Laboratory  [x]  Microbiology  []  Radiology  []  EKG/Telemetry   []  Cardiology/Vascular   []  Pathology  []  Old records  []  Other:      Assessment/Plan   Assessment / Plan     Brief Patient Summary:  Pascual Patel is a 46 y.o. male   Tylenol hepatotoxicity- clinically improving, most last are improving except ALT, reasons not clear  Nausea improved  Coagulopathy stable     Active Hospital Problems:  Active Hospital Problems    Diagnosis    • Acetaminophen overdose      Plan: recheck cmp and pt at noon  I reviewed with  case with Dr Jernigan at [facility] hepatology.  He is not sure why all parameters improving except the alt.  Given the patient has normal renal function, is alert, INR is stable,  Warrants further observation.  No need for transfer to [facility].    Complaining of toothace, panorex ordered       DVT prophylaxis:  Mechanical DVT prophylaxis orders are present.    CODE STATUS:    Code Status: CPR  Medical Interventions (Level of Support Prior to Arrest): Full    Disposition:  I expect patient to be discharged few days if stable.   Electronically signed by Shahbaz Can MD, 09/03/21, 10:56 AM EDT.

## 2021-09-03 NOTE — PLAN OF CARE
Goal Outcome Evaluation:  Plan of Care Reviewed With: patient        Progress: improving  Outcome Summary: pt feeling much better today, pt  a & o x4, pt walks around nursing unit with mask on without difficulty, pt continues to take lactulose per md order, will continue to monitor

## 2021-09-03 NOTE — PROGRESS NOTES
"Daily progress note    Chief complaint  Doing same  No specific complaints  Denies any nausea vomiting diarrhea    History of present illness  46-year-old white male with no medical problem and no home medications presented to [facility] with nausea vomiting started yesterday early in the morning.  Patient apparently took a lot of Tylenol secondary to toothache.  Patient denies any suicidal homicidal ideation.  Patient denies any Tylenol abuse in the past.  Patient work-up in ER revealed Tylenol toxicity admit for management.      REVIEW OF SYSTEMS  Unremarkable     PHYSICAL EXAM   Blood pressure 135/89, pulse 60, temperature 98.2 °F (36.8 °C), temperature source Oral, resp. rate 16, height 175.3 cm (69\"), weight 120 kg (264 lb), SpO2 95 %.    GENERAL: Awake and alert, nontoxic-appearing  HENT: nares patent  EYES: no scleral icterus  CV: regular rhythm, regular rate  RESPIRATORY: normal effort  ABDOMEN: soft, mild tenderness without rebound or guarding bowel sounds present  MUSCULOSKELETAL: no deformity  NEURO: alert, moves all extremities, follows commands  SKIN: warm, dry     LAB RESULTS  Lab Results (last 24 hours)     Procedure Component Value Units Date/Time    Protime-INR [[id number]] Collected: 09/03/21 1323    Specimen: Blood Updated: 09/03/21 1338    Comprehensive Metabolic Panel [[id number]] Collected: 09/03/21 1323    Specimen: Blood Updated: 09/03/21 1337    CK [[id number]] Collected: 09/03/21 1323    Specimen: Blood Updated: 09/03/21 1337    Lactate Dehydrogenase [[id number]]  (Abnormal) Collected: 09/03/21 0521    Specimen: Blood Updated: 09/03/21 1136      U/L      Comment: Specimen hemolyzed.  Results may be affected.       Comprehensive Metabolic Panel [[id number]]  (Abnormal) Collected: 09/03/21 0521    Specimen: Blood Updated: 09/03/21 0650     Glucose 89 mg/dL      BUN 8 mg/dL      Creatinine 0.83 mg/dL      Sodium 136 mmol/L      Potassium 3.7 mmol/L      Chloride 106 mmol/L      " CO2 20.3 mmol/L      Calcium 8.6 mg/dL      Total Protein 6.2 g/dL      Albumin 3.40 g/dL      ALT (SGPT) >7,000 U/L      AST (SGOT) 257 U/L      Alkaline Phosphatase 70 U/L      Total Bilirubin 0.8 mg/dL      eGFR Non African Amer 100 mL/min/1.73      Globulin 2.8 gm/dL      A/G Ratio 1.2 g/dL      BUN/Creatinine Ratio 9.6     Anion Gap 9.7 mmol/L     Narrative:      GFR Normal >60  Chronic Kidney Disease <60  Kidney Failure <15      Blood Culture - Blood, Arm, Left [[id number]]  (Abnormal)  (Susceptibility) Collected: 08/30/21 2154    Specimen: Blood from Arm, Left Updated: 09/03/21 0640     Blood Culture Brevundimonas diminuta / vesicularis     Gram Stain Aerobic Bottle Gram negative bacilli    Susceptibility      Brevundimonas diminuta / vesicularis      OLIVA      Cefepime Susceptible      Ceftazidime Resistant      Gentamicin Susceptible      Levofloxacin Resistant      Meropenem Susceptible      Piperacillin + Tazobactam Susceptible      Tetracycline Susceptible               Linear View                   Protime-INR [[id number]]  (Abnormal) Collected: 09/03/21 0521    Specimen: Blood Updated: 09/03/21 0609     Protime 18.4 Seconds      INR 1.56    Ammonia [[id number]]  (Abnormal) Collected: 09/03/21 0521    Specimen: Blood Updated: 09/03/21 0608     Ammonia 73 umol/L     CBC & Differential [[id number]]  (Abnormal) Collected: 09/03/21 0521    Specimen: Blood Updated: 09/03/21 0558    Narrative:      The following orders were created for panel order CBC & Differential.  Procedure                               Abnormality         Status                     ---------                               -----------         ------                     CBC Auto Differential[[id number]]        Abnormal            Final result                 Please view results for these tests on the individual orders.    CBC Auto Differential [[id number]]  (Abnormal) Collected: 09/03/21 0521    Specimen: Blood Updated: 09/03/21 0558     WBC 6.93  10*3/mm3      RBC 3.96 10*6/mm3      Hemoglobin 12.8 g/dL      Hematocrit 36.5 %      MCV 92.2 fL      MCH 32.3 pg      MCHC 35.1 g/dL      RDW 13.0 %      RDW-SD 43.0 fl      MPV 10.7 fL      Platelets 158 10*3/mm3      Neutrophil % 50.9 %      Lymphocyte % 32.9 %      Monocyte % 8.2 %      Eosinophil % 6.1 %      Basophil % 0.7 %      Immature Grans % 1.2 %      Neutrophils, Absolute 3.53 10*3/mm3      Lymphocytes, Absolute 2.28 10*3/mm3      Monocytes, Absolute 0.57 10*3/mm3      Eosinophils, Absolute 0.42 10*3/mm3      Basophils, Absolute 0.05 10*3/mm3      Immature Grans, Absolute 0.08 10*3/mm3      nRBC 0.1 /100 WBC     Blood Culture - Blood, Arm, Left [[id number]] Collected: 08/30/21 2154    Specimen: Blood from Arm, Left Updated: 09/02/21 2215     Blood Culture No growth at 3 days    Blood Culture - Blood, Hand, Left [[id number]] Collected: 09/01/21 1925    Specimen: Blood from Hand, Left Updated: 09/02/21 1945     Blood Culture No growth at 24 hours    Blood Culture - Blood, Hand, Right [[id number]] Collected: 09/01/21 1932    Specimen: Blood from Hand, Right Updated: 09/02/21 1945     Blood Culture No growth at 24 hours        Imaging Results (Last 24 Hours)     ** No results found for the last 24 hours. **        ECG 12 Lead  Component   Ref Range & Units 8/30/21 2052   QT Interval   ms 331         HEART RATE= 91  bpm  RR Interval= 660  ms  OK Interval= 147  ms  P Horizontal Axis= -19  deg  P Front Axis= 50  deg  QRSD Interval= 93  ms  QT Interval= 331  ms  QRS Axis= 25  deg  T Wave Axis= 25  deg  - BORDERLINE ECG -  Sinus rhythm  Probable left atrial enlargement  NO PRIOR TRACING AVAILABLE FOR COMPARISON             Current Facility-Administered Medications:   •  azithromycin (ZITHROMAX) tablet 250 mg, 250 mg, Oral, Q24H, Keira Cervantes MD, 250 mg at 09/03/21 0821  •  lactulose (CHRONULAC) 10 GM/15ML solution 20 g, 20 g, Oral, TID, Gopi Peralta MD, 20 g at 09/03/21 0843  •  ondansetron (ZOFRAN) injection 4  mg, 4 mg, Intravenous, Q4H PRN, Bernadette Celeste MD, 4 mg at 09/02/21 0143  •  pantoprazole (PROTONIX) EC tablet 40 mg, 40 mg, Oral, BID AC, Bernadette Celeste MD, 40 mg at 09/03/21 0821  •  piperacillin-tazobactam (ZOSYN) 3.375 g in iso-osmotic dextrose 50 ml (premix), 3.375 g, Intravenous, Q8H, Bernadette Celeste MD, 3.375 g at 09/03/21 1112  •  [COMPLETED] Insert peripheral IV, , , Once **AND** sodium chloride 0.9 % flush 10 mL, 10 mL, Intravenous, PRN, Edwin Hein MD, 10 mL at 09/03/21 0821  •  sodium chloride 0.9 % with KCl 20 mEq/L infusion, 75 mL/hr, Intravenous, Continuous, Bernadette Celeste MD, Last Rate: 75 mL/hr at 09/03/21 0310, 75 mL/hr at 09/03/21 0310  •  sucralfate (CARAFATE) tablet 1 g, 1 g, Oral, 4x Daily AC & at Bedtime, Shahbaz Can MD, 1 g at 09/03/21 1112     ASSESSMENT  GNB bacteremia and sepsis  Tylenol toxicity worsening today  Elevated LFTs   Diffuse wall thickening of the colon    PLAN  CPM  S/P Acetodate  IVF  Continue IV antibiotics  Check 2D echo  Infectious disease consult appreciated  GI to follow patient  Symptomatic treatment for pain nausea vomiting  Supportive care  Discussed with nursing staff family and GI  Follow closely with recommendation current hospital course    BERNADETTE CELESTE MD

## 2021-09-04 LAB
ALBUMIN SERPL-MCNC: 3.4 G/DL (ref 3.5–5.2)
ALBUMIN/GLOB SERPL: 1.1 G/DL
ALP SERPL-CCNC: 71 U/L (ref 39–117)
ALT SERPL W P-5'-P-CCNC: 799 U/L (ref 1–41)
AMMONIA BLD-SCNC: 61 UMOL/L (ref 16–60)
ANION GAP SERPL CALCULATED.3IONS-SCNC: 11.8 MMOL/L (ref 5–15)
AST SERPL-CCNC: 125 U/L (ref 1–40)
BACTERIA SPEC AEROBE CULT: NORMAL
BILIRUB SERPL-MCNC: 0.8 MG/DL (ref 0–1.2)
BUN SERPL-MCNC: 8 MG/DL (ref 6–20)
BUN/CREAT SERPL: 9.9 (ref 7–25)
CALCIUM SPEC-SCNC: 8.7 MG/DL (ref 8.6–10.5)
CHLORIDE SERPL-SCNC: 107 MMOL/L (ref 98–107)
CO2 SERPL-SCNC: 20.2 MMOL/L (ref 22–29)
CREAT SERPL-MCNC: 0.81 MG/DL (ref 0.76–1.27)
GFR SERPL CREATININE-BSD FRML MDRD: 103 ML/MIN/1.73
GLOBULIN UR ELPH-MCNC: 3.2 GM/DL
GLUCOSE SERPL-MCNC: 83 MG/DL (ref 65–99)
INR PPP: 1.26 (ref 0.9–1.1)
POTASSIUM SERPL-SCNC: 4 MMOL/L (ref 3.5–5.2)
PROT SERPL-MCNC: 6.6 G/DL (ref 6–8.5)
PROTHROMBIN TIME: 15.6 SECONDS (ref 11.7–14.2)
SODIUM SERPL-SCNC: 139 MMOL/L (ref 136–145)

## 2021-09-04 PROCEDURE — 99231 SBSQ HOSP IP/OBS SF/LOW 25: CPT | Performed by: INTERNAL MEDICINE

## 2021-09-04 PROCEDURE — 25010000002 PIPERACILLIN SOD-TAZOBACTAM PER 1 G: Performed by: HOSPITALIST

## 2021-09-04 PROCEDURE — 25010000002 SODIUM CHLORIDE 0.9 % WITH KCL 20 MEQ 20-0.9 MEQ/L-% SOLUTION: Performed by: HOSPITALIST

## 2021-09-04 PROCEDURE — 80053 COMPREHEN METABOLIC PANEL: CPT | Performed by: HOSPITALIST

## 2021-09-04 PROCEDURE — 82140 ASSAY OF AMMONIA: CPT | Performed by: HOSPITALIST

## 2021-09-04 PROCEDURE — 85610 PROTHROMBIN TIME: CPT | Performed by: HOSPITALIST

## 2021-09-04 RX ORDER — LACTULOSE 10 G/15ML
10 SOLUTION ORAL 2 TIMES DAILY
Status: DISCONTINUED | OUTPATIENT
Start: 2021-09-05 | End: 2021-09-05 | Stop reason: HOSPADM

## 2021-09-04 RX ADMIN — TAZOBACTAM SODIUM AND PIPERACILLIN SODIUM 3.38 G: 375; 3 INJECTION, SOLUTION INTRAVENOUS at 13:00

## 2021-09-04 RX ADMIN — PANTOPRAZOLE SODIUM 40 MG: 40 TABLET, DELAYED RELEASE ORAL at 06:20

## 2021-09-04 RX ADMIN — POTASSIUM CHLORIDE AND SODIUM CHLORIDE 75 ML/HR: 900; 150 INJECTION, SOLUTION INTRAVENOUS at 21:09

## 2021-09-04 RX ADMIN — LACTULOSE 30 G: 10 SOLUTION ORAL at 09:24

## 2021-09-04 RX ADMIN — PANTOPRAZOLE SODIUM 40 MG: 40 TABLET, DELAYED RELEASE ORAL at 17:39

## 2021-09-04 RX ADMIN — TAZOBACTAM SODIUM AND PIPERACILLIN SODIUM 3.38 G: 375; 3 INJECTION, SOLUTION INTRAVENOUS at 02:52

## 2021-09-04 RX ADMIN — SUCRALFATE 1 G: 1 TABLET ORAL at 06:19

## 2021-09-04 RX ADMIN — SUCRALFATE 1 G: 1 TABLET ORAL at 21:09

## 2021-09-04 RX ADMIN — POTASSIUM CHLORIDE AND SODIUM CHLORIDE 125 ML/HR: 900; 150 INJECTION, SOLUTION INTRAVENOUS at 01:34

## 2021-09-04 RX ADMIN — POTASSIUM CHLORIDE AND SODIUM CHLORIDE 125 ML/HR: 900; 150 INJECTION, SOLUTION INTRAVENOUS at 09:24

## 2021-09-04 RX ADMIN — SUCRALFATE 1 G: 1 TABLET ORAL at 11:54

## 2021-09-04 RX ADMIN — TAZOBACTAM SODIUM AND PIPERACILLIN SODIUM 3.38 G: 375; 3 INJECTION, SOLUTION INTRAVENOUS at 21:09

## 2021-09-04 RX ADMIN — SUCRALFATE 1 G: 1 TABLET ORAL at 17:39

## 2021-09-04 RX ADMIN — AZITHROMYCIN DIHYDRATE 250 MG: 250 TABLET, FILM COATED ORAL at 09:21

## 2021-09-04 NOTE — PROGRESS NOTES
"Daily progress note    Chief complaint  Doing better  No specific complaints  Denies any nausea vomiting diarrhea    History of present illness  46-year-old white male with no medical problem and no home medications presented to [facility] with nausea vomiting started yesterday early in the morning.  Patient apparently took a lot of Tylenol secondary to toothache.  Patient denies any suicidal homicidal ideation.  Patient denies any Tylenol abuse in the past.  Patient work-up in ER revealed Tylenol toxicity admit for management.      REVIEW OF SYSTEMS  Unremarkable     PHYSICAL EXAM   Blood pressure 135/88, pulse 56, temperature 98.3 °F (36.8 °C), temperature source Oral, resp. rate 18, height 175.3 cm (69\"), weight 120 kg (264 lb 1.6 oz), SpO2 91 %.    GENERAL: Awake and alert, nontoxic-appearing  HENT: nares patent  EYES: no scleral icterus  CV: regular rhythm, regular rate  RESPIRATORY: normal effort  ABDOMEN: soft, mild tenderness without rebound or guarding bowel sounds present  MUSCULOSKELETAL: no deformity  NEURO: alert, moves all extremities, follows commands  SKIN: warm, dry     LAB RESULTS  Lab Results (last 24 hours)     Procedure Component Value Units Date/Time    Comprehensive Metabolic Panel [[id number]]  (Abnormal) Collected: 09/04/21 0607    Specimen: Blood Updated: 09/04/21 0748     Glucose 83 mg/dL      BUN 8 mg/dL      Creatinine 0.81 mg/dL      Sodium 139 mmol/L      Potassium 4.0 mmol/L      Chloride 107 mmol/L      CO2 20.2 mmol/L      Calcium 8.7 mg/dL      Total Protein 6.6 g/dL      Albumin 3.40 g/dL      ALT (SGPT) 799 U/L      AST (SGOT) 125 U/L      Alkaline Phosphatase 71 U/L      Total Bilirubin 0.8 mg/dL      eGFR Non African Amer 103 mL/min/1.73      Globulin 3.2 gm/dL      A/G Ratio 1.1 g/dL      BUN/Creatinine Ratio 9.9     Anion Gap 11.8 mmol/L     Narrative:      GFR Normal >60  Chronic Kidney Disease <60  Kidney Failure <15      Protime-INR [[id number]]  (Abnormal) " Collected: 09/04/21 0607    Specimen: Blood Updated: 09/04/21 0739     Protime 15.6 Seconds      INR 1.26    Ammonia [[id number]]  (Abnormal) Collected: 09/04/21 0607    Specimen: Blood Updated: 09/04/21 0720     Ammonia 61 umol/L     Blood Culture - Blood, Arm, Left [[id number]] Collected: 08/30/21 2154    Specimen: Blood from Arm, Left Updated: 09/03/21 2215     Blood Culture No growth at 4 days    Blood Culture - Blood, Hand, Right [[id number]] Collected: 09/01/21 1932    Specimen: Blood from Hand, Right Updated: 09/03/21 1945     Blood Culture No growth at 2 days    Blood Culture - Blood, Hand, Left [[id number]] Collected: 09/01/21 1925    Specimen: Blood from Hand, Left Updated: 09/03/21 1945     Blood Culture No growth at 2 days        Imaging Results (Last 24 Hours)     ** No results found for the last 24 hours. **        ECG 12 Lead  Component   Ref Range & Units 8/30/21 2052   QT Interval   ms 331         HEART RATE= 91  bpm  RR Interval= 660  ms  NE Interval= 147  ms  P Horizontal Axis= -19  deg  P Front Axis= 50  deg  QRSD Interval= 93  ms  QT Interval= 331  ms  QRS Axis= 25  deg  T Wave Axis= 25  deg  - BORDERLINE ECG -  Sinus rhythm  Probable left atrial enlargement  NO PRIOR TRACING AVAILABLE FOR COMPARISON         2D echo within normal limits    Current Facility-Administered Medications:   •  azithromycin (ZITHROMAX) tablet 250 mg, 250 mg, Oral, Q24H, Keira Cervantes MD, 250 mg at 09/04/21 0921  •  lactulose (CHRONULAC) 10 GM/15ML solution 30 g, 30 g, Oral, TID, Gopi Peralta MD, 30 g at 09/04/21 0924  •  ondansetron (ZOFRAN) injection 4 mg, 4 mg, Intravenous, Q4H PRN, Gopi Peralta MD, 4 mg at 09/02/21 0143  •  pantoprazole (PROTONIX) EC tablet 40 mg, 40 mg, Oral, BID AC, Gopi Peralta MD, 40 mg at 09/04/21 0620  •  piperacillin-tazobactam (ZOSYN) 3.375 g in iso-osmotic dextrose 50 ml (premix), 3.375 g, Intravenous, Q8H, Gopi Peralta MD, 3.375 g at 09/04/21 1154  •  [COMPLETED] Insert peripheral IV, , ,  Once **AND** sodium chloride 0.9 % flush 10 mL, 10 mL, Intravenous, PRN, Edwin Hein MD, 10 mL at 09/03/21 0821  •  sodium chloride 0.9 % with KCl 20 mEq/L infusion, 125 mL/hr, Intravenous, Continuous, Bernadette Celeste MD, Last Rate: 125 mL/hr at 09/04/21 0924, 125 mL/hr at 09/04/21 0924  •  sucralfate (CARAFATE) tablet 1 g, 1 g, Oral, 4x Daily AC & at Bedtime, Shahbaz Can MD, 1 g at 09/04/21 1154     ASSESSMENT  GNB bacteremia and sepsis  Tylenol toxicity resolving  Elevated LFTs improving  Hepatic encephalopathy  Diffuse wall thickening of the colon    PLAN  CPM  S/P Acetodate  IVF  IV antibiotics per infectious disease  Infectious disease consult appreciated  GI to follow patient  Symptomatic treatment for pain nausea vomiting  Supportive care  Discussed with nursing staff family and GI  Discharge planning    BERNADETTE CELESTE MD

## 2021-09-04 NOTE — PROGRESS NOTES
[facility]  Inpatient Progress Note    Reason for Follow Up:  Acetaminophen overdose    Subjective     Interval History:   He has no abdominal pain.  No nausea or vomiting.  His liver function tests are continuing to decrease.  His INR is coming down and is 1.26 today.  We did discuss the thick: Noted on CAT scan of the abdomen and the fact that he has not had a colonoscopy at 46 years of age.    Current Facility-Administered Medications:   •  azithromycin (ZITHROMAX) tablet 250 mg, 250 mg, Oral, Q24H, Keira Cervantes MD, 250 mg at 09/04/21 0921  •  lactulose (CHRONULAC) 10 GM/15ML solution 30 g, 30 g, Oral, TID, Gopi Peralta MD, 30 g at 09/04/21 0924  •  ondansetron (ZOFRAN) injection 4 mg, 4 mg, Intravenous, Q4H PRN, Gopi Peralta MD, 4 mg at 09/02/21 0143  •  pantoprazole (PROTONIX) EC tablet 40 mg, 40 mg, Oral, BID AC, Gopi Peralta MD, 40 mg at 09/04/21 0620  •  piperacillin-tazobactam (ZOSYN) 3.375 g in iso-osmotic dextrose 50 ml (premix), 3.375 g, Intravenous, Q8H, Gopi Peralta MD, 3.375 g at 09/04/21 0252  •  [COMPLETED] Insert peripheral IV, , , Once **AND** sodium chloride 0.9 % flush 10 mL, 10 mL, Intravenous, PRN, Edwin Hein MD, 10 mL at 09/03/21 0821  •  sodium chloride 0.9 % with KCl 20 mEq/L infusion, 125 mL/hr, Intravenous, Continuous, Gopi Peralta MD, Last Rate: 125 mL/hr at 09/04/21 0924, 125 mL/hr at 09/04/21 0924  •  sucralfate (CARAFATE) tablet 1 g, 1 g, Oral, 4x Daily AC & at Bedtime, Shahbaz Can MD, 1 g at 09/04/21 0619  Review of Systems:    The following systems were reviewed and negative;  constitution, ENT, respiratory, cardiovascular, genitourinary, musculoskeletal and neurological    Objective     Vital Signs  Temp:  [97.8 °F (36.6 °C)-98.7 °F (37.1 °C)] 98.7 °F (37.1 °C)  Heart Rate:  [50-66] 62  Resp:  [16-18] 18  BP: (122-151)/(76-98) 140/98  Body mass index is 39 kg/m².    Intake/Output Summary (Last 24 hours) at 9/4/2021 1031  Last data filed at  9/4/2021 0924  Gross per 24 hour   Intake 2050 ml   Output --   Net 2050 ml     I/O this shift:  In: 1000 [I.V.:1000]  Out: -      Physical Exam:   General: patient awake, alert and cooperative   Eyes: Normal lids and lashes, no scleral icterus   Neck: supple, normal ROM   Skin: warm and dry, not jaundiced   Cardiovascular: regular rhythm and rate, no murmurs auscultated   Pulm: clear to auscultation bilaterally, regular and unlabored   Abdomen: soft, nontender, nondistended; normal bowel sounds   Extremities: no rash or edema   Psychiatric: Normal mood and behavior; memory intact     Results Review:     I reviewed the patient's new clinical results.    Results from last 7 days   Lab Units 09/03/21  0521 09/02/21  0425 09/01/21  0530   WBC 10*3/mm3 6.93 7.90 9.48   HEMOGLOBIN g/dL 12.8* 13.1 13.2   HEMATOCRIT % 36.5* 38.5 36.9*   PLATELETS 10*3/mm3 158 141 139*     Results from last 7 days   Lab Units 09/04/21  0607 09/03/21  1323 09/03/21  0521   SODIUM mmol/L 139 140 136   POTASSIUM mmol/L 4.0 3.8 3.7   CHLORIDE mmol/L 107 108* 106   CO2 mmol/L 20.2* 21.1* 20.3*   BUN mg/dL 8 8 8   CREATININE mg/dL 0.81 0.88 0.83   CALCIUM mg/dL 8.7 8.5* 8.6   BILIRUBIN mg/dL 0.8 0.8 0.8   ALK PHOS U/L 71 71 70   ALT (SGPT) U/L 799* 1,046* 1,138*   AST (SGOT) U/L 125* 201* 257*   GLUCOSE mg/dL 83 114* 89     Results from last 7 days   Lab Units 09/04/21  0607 09/03/21  1323 09/03/21  0521   INR  1.26* 1.30* 1.56*     Lab Results   Lab Value Date/Time    LIPASE 15 08/30/2021 2044       Radiology:  XR Panorex   Final Result      CT Head Without Contrast   Final Result         Electronically signed by Sony Vazquez MD on 08-31-21 at 0027      CT Abdomen Pelvis With Contrast   Final Result         Electronically signed by Sony Vazquez MD on 08-31-21 at 0031          Assessment/Plan     Patient Active Problem List   Diagnosis   • Acetaminophen overdose       Assessment/Recommendations:    - Acetaminophen overdose -he is doing  well and could be discharged from a GI standpoint.  -Colonic thickening noted on CAT scan of the abdomen.  I would recommend that he follow-up with Dr. Shahbaz Can and have an outpatient colonoscopy.    I discussed the patients findings and my recommendations with patient.    David Shah MD

## 2021-09-04 NOTE — PROGRESS NOTES
"  Infectious Diseases Progress Note        [facility]  [place]: 5 days  Patient Identification:  Name: Pascual Patel  Age: 46 y.o.  Sex: male  :  1975  MRN: [id number]         Primary Care Physician: Provider, No Known            Subjective: Feeling better and denies any specific complaints.  Interval History: See consultation note.    Objective:    Scheduled Meds:azithromycin, 250 mg, Oral, Q24H  lactulose, 30 g, Oral, TID  pantoprazole, 40 mg, Oral, BID AC  piperacillin-tazobactam, 3.375 g, Intravenous, Q8H  sucralfate, 1 g, Oral, 4x Daily AC & at Bedtime      Continuous Infusions:sodium chloride 0.9 % with KCl 20 mEq, 125 mL/hr, Last Rate: 125 mL/hr (21 0924)        Vital signs in last 24 hours:  Temp:  [97.8 °F (36.6 °C)-98.7 °F (37.1 °C)] 98.7 °F (37.1 °C)  Heart Rate:  [50-66] 62  Resp:  [16-18] 18  BP: (122-151)/(76-98) 140/98    Intake/Output:    Intake/Output Summary (Last 24 hours) at 2021 1307  Last data filed at 2021 1154  Gross per 24 hour   Intake 2050 ml   Output --   Net 2050 ml       Exam:  /98 (BP Location: Right arm, Patient Position: Lying)   Pulse 62   Temp 98.7 °F (37.1 °C) (Oral)   Resp 18   Ht 175.3 cm (69\")   Wt 120 kg (264 lb 1.6 oz)   SpO2 97%   BMI 39.00 kg/m²   Patient is examined using the personal protective equipment as per guidelines from infection control for this particular patient as enacted.  Hand washing was performed before and after patient interaction.  General Appearance:    Alert, cooperative, no distress, AAOx3                          Head:    Normocephalic, without obvious abnormality, atraumatic                           Eyes:    PERRL, conjunctivae/corneas clear, EOM's intact, both eyes                         Throat:   Lips, tongue, gums normal; oral mucosa pink and moist                           Neck:   Supple, symmetrical, trachea midline, no JVD                         Lungs:    Clear to auscultation bilaterally, " respirations unlabored                 Chest Wall:    No tenderness or deformity                          Heart:    Regular rate and rhythm, S1 and S2 normal, no murmur, no rub                                         or gallop                  Abdomen:     Soft, non-tender, bowel sounds active, no masses, no                                                        organomegaly                  Extremities:   Extremities normal, atraumatic, no cyanosis or edema                        Pulses:   Pulses palpable in all extremities                            Skin:   Skin is warm and dry,  no rashes or palpable lesions                  Neurologic:   CNII-XII intact, motor strength grossly intact, sensation grossly                                         intact to light touch, no focal deficits noted       Data Review:    I reviewed the patient's new clinical results.  Results from last 7 days   Lab Units 09/03/21  0521 09/02/21  0425 09/01/21  0530 08/31/21  0307 08/30/21  2044   WBC 10*3/mm3 6.93 7.90 9.48 16.85* 17.77*   HEMOGLOBIN g/dL 12.8* 13.1 13.2 16.6 17.6   PLATELETS 10*3/mm3 158 141 139* 153 169     Results from last 7 days   Lab Units 09/04/21  0607 09/03/21  1323 09/03/21  0521 09/02/21  0425 09/01/21  1209 09/01/21  0530 08/31/21  1202   SODIUM mmol/L 139 140 136 137 136 137 138   POTASSIUM mmol/L 4.0 3.8 3.7 3.5 3.4* 3.3* 3.7   CHLORIDE mmol/L 107 108* 106 108* 106 106 104   CO2 mmol/L 20.2* 21.1* 20.3* 20.9* 20.7* 20.5* 22.4   BUN mg/dL 8 8 8 9 12 13 16   CREATININE mg/dL 0.81 0.88 0.83 0.77 0.78 0.65* 1.04   CALCIUM mg/dL 8.7 8.5* 8.6 8.3* 8.6 8.4* 8.2*   GLUCOSE mg/dL 83 114* 89 77 100* 100* 105*     Microbiology Results (last 10 days)     Procedure Component Value - Date/Time    Blood Culture - Blood, Hand, Right [[id number]] Collected: 09/01/21 1932    Lab Status: Preliminary result Specimen: Blood from Hand, Right Updated: 09/02/21 1945     Blood Culture No growth at 24 hours    Blood Culture - Blood,  Hand, Left [[id number]] Collected: 09/01/21 1925    Lab Status: Preliminary result Specimen: Blood from Hand, Left Updated: 09/02/21 1945     Blood Culture No growth at 24 hours    COVID PRE-OP / PRE-PROCEDURE SCREENING ORDER (NO ISOLATION) - Swab, Nasopharynx [[id number]]  (Normal) Collected: 08/30/21 2155    Lab Status: Final result Specimen: Swab from Nasopharynx Updated: 08/31/21 0002    Narrative:      The following orders were created for panel order COVID PRE-OP / PRE-PROCEDURE SCREENING ORDER (NO ISOLATION) - Swab, Nasopharynx.  Procedure                               Abnormality         Status                     ---------                               -----------         ------                     COVID-19,[facility] IN-HOUSE...[[id number]]  Normal              Final result                 Please view results for these tests on the individual orders.    COVID-19,[facility] IN-HOU[facility] CEPHEID/LINWOOD NP SWAB IN TRANSPORT MEDIA 8-12 HR TAT - Swab, Nasopharynx [[id number]]  (Normal) Collected: 08/30/21 2155    Lab Status: Final result Specimen: Swab from Nasopharynx Updated: 08/31/21 0002     COVID19 Not Detected    Narrative:      Fact sheet for providers: https://www.fda.gov/media/544282/download     Fact sheet for patients: https://www.fda.gov/media/013864/download    Blood Culture - Blood, Arm, Left [[id number]]  (Abnormal)  (Susceptibility) Collected: 08/30/21 2154    Lab Status: Final result Specimen: Blood from Arm, Left Updated: 09/03/21 0640     Blood Culture Brevundimonas diminuta / vesicularis     Gram Stain Aerobic Bottle Gram negative bacilli    Susceptibility      Brevundimonas diminuta / vesicularis      OLIVA      Cefepime Susceptible      Ceftazidime Resistant      Gentamicin Susceptible      Levofloxacin Resistant      Meropenem Susceptible      Piperacillin + Tazobactam Susceptible      Tetracycline Susceptible               Linear View                   Blood Culture - Blood, Arm, Left [[id number]] Collected:  08/30/21 2154    Lab Status: Preliminary result Specimen: Blood from Arm, Left Updated: 09/02/21 2215     Blood Culture No growth at 3 days    Blood Culture ID, PCR - Blood, Arm, Left [[id number]]  (Normal) Collected: 08/30/21 2154    Lab Status: Final result Specimen: Blood from Arm, Left Updated: 09/01/21 1437     BCID, PCR Negative by BCID PCR. Culture to Follow.              Assessment:  1-gram-negative bacterem 1 out of 2 with Brevundimonas  2-poor dentition with possible dental abscess  3-status post Tylenol overdose with transaminitis  4-CT scan abnormality seen on the colon but with no clinical symptoms or signs to go with it significance of the CT scan finding unclear  5-other diagnosis per primary team.        Recommendations/Discussions:  · Presence of Brevundimonas diminuta / vesicularis in the blood culture is likely contaminant during the process of collection or issue during the processing of his blood culture sample in the microbiology department as this pathogen does not reflect his underlying infectious process which is odontogenic infection.  · Follow-up on repeat blood culture results and if repeat blood cultures are positive then low threshold to check 2D echo with Doppler.    · With echocardiogram is okay then patient can be switched to oral Augmentin for couple of weeks with outpatient follow-up with the oral maxillofacial surgery service or dentist to address his infected decaying teeth.    Scott Dean MD  9/4/2021  13:07 EDT

## 2021-09-05 VITALS
WEIGHT: 264.1 LBS | TEMPERATURE: 97.5 F | SYSTOLIC BLOOD PRESSURE: 149 MMHG | HEART RATE: 57 BPM | OXYGEN SATURATION: 91 % | DIASTOLIC BLOOD PRESSURE: 93 MMHG | RESPIRATION RATE: 18 BRPM | BODY MASS INDEX: 39.12 KG/M2 | HEIGHT: 69 IN

## 2021-09-05 LAB
ALBUMIN SERPL-MCNC: 3.7 G/DL (ref 3.5–5.2)
ALBUMIN/GLOB SERPL: 1.2 G/DL
ALP SERPL-CCNC: 71 U/L (ref 39–117)
ALT SERPL W P-5'-P-CCNC: 619 U/L (ref 1–41)
AMMONIA BLD-SCNC: 79 UMOL/L (ref 16–60)
ANION GAP SERPL CALCULATED.3IONS-SCNC: 10.4 MMOL/L (ref 5–15)
AST SERPL-CCNC: 75 U/L (ref 1–40)
BASOPHILS # BLD AUTO: 0.09 10*3/MM3 (ref 0–0.2)
BASOPHILS NFR BLD AUTO: 0.8 % (ref 0–1.5)
BILIRUB SERPL-MCNC: 0.9 MG/DL (ref 0–1.2)
BUN SERPL-MCNC: 7 MG/DL (ref 6–20)
BUN/CREAT SERPL: 8.4 (ref 7–25)
CALCIUM SPEC-SCNC: 9.1 MG/DL (ref 8.6–10.5)
CHLORIDE SERPL-SCNC: 107 MMOL/L (ref 98–107)
CO2 SERPL-SCNC: 20.6 MMOL/L (ref 22–29)
CREAT SERPL-MCNC: 0.83 MG/DL (ref 0.76–1.27)
DEPRECATED RDW RBC AUTO: 43.7 FL (ref 37–54)
EOSINOPHIL # BLD AUTO: 0.93 10*3/MM3 (ref 0–0.4)
EOSINOPHIL NFR BLD AUTO: 8.7 % (ref 0.3–6.2)
ERYTHROCYTE [DISTWIDTH] IN BLOOD BY AUTOMATED COUNT: 13.1 % (ref 12.3–15.4)
GFR SERPL CREATININE-BSD FRML MDRD: 100 ML/MIN/1.73
GLOBULIN UR ELPH-MCNC: 3.2 GM/DL
GLUCOSE SERPL-MCNC: 110 MG/DL (ref 65–99)
HCT VFR BLD AUTO: 37.9 % (ref 37.5–51)
HGB BLD-MCNC: 13.3 G/DL (ref 13–17.7)
IMM GRANULOCYTES # BLD AUTO: 0.29 10*3/MM3 (ref 0–0.05)
IMM GRANULOCYTES NFR BLD AUTO: 2.7 % (ref 0–0.5)
INR PPP: 1.34 (ref 0.9–1.1)
LYMPHOCYTES # BLD AUTO: 2.69 10*3/MM3 (ref 0.7–3.1)
LYMPHOCYTES NFR BLD AUTO: 25 % (ref 19.6–45.3)
MCH RBC QN AUTO: 32.6 PG (ref 26.6–33)
MCHC RBC AUTO-ENTMCNC: 35.1 G/DL (ref 31.5–35.7)
MCV RBC AUTO: 92.9 FL (ref 79–97)
MONOCYTES # BLD AUTO: 0.7 10*3/MM3 (ref 0.1–0.9)
MONOCYTES NFR BLD AUTO: 6.5 % (ref 5–12)
NEUTROPHILS NFR BLD AUTO: 56.3 % (ref 42.7–76)
NEUTROPHILS NFR BLD AUTO: 6.04 10*3/MM3 (ref 1.7–7)
NRBC BLD AUTO-RTO: 0.1 /100 WBC (ref 0–0.2)
PLATELET # BLD AUTO: 206 10*3/MM3 (ref 140–450)
PMV BLD AUTO: 10.1 FL (ref 6–12)
POTASSIUM SERPL-SCNC: 4 MMOL/L (ref 3.5–5.2)
PROT SERPL-MCNC: 6.9 G/DL (ref 6–8.5)
PROTHROMBIN TIME: 16.3 SECONDS (ref 11.7–14.2)
RBC # BLD AUTO: 4.08 10*6/MM3 (ref 4.14–5.8)
SODIUM SERPL-SCNC: 138 MMOL/L (ref 136–145)
WBC # BLD AUTO: 10.74 10*3/MM3 (ref 3.4–10.8)

## 2021-09-05 PROCEDURE — 85025 COMPLETE CBC W/AUTO DIFF WBC: CPT | Performed by: HOSPITALIST

## 2021-09-05 PROCEDURE — 80053 COMPREHEN METABOLIC PANEL: CPT | Performed by: HOSPITALIST

## 2021-09-05 PROCEDURE — 85610 PROTHROMBIN TIME: CPT | Performed by: HOSPITALIST

## 2021-09-05 PROCEDURE — 25010000002 PIPERACILLIN SOD-TAZOBACTAM PER 1 G: Performed by: HOSPITALIST

## 2021-09-05 PROCEDURE — 99231 SBSQ HOSP IP/OBS SF/LOW 25: CPT | Performed by: INTERNAL MEDICINE

## 2021-09-05 PROCEDURE — 82140 ASSAY OF AMMONIA: CPT | Performed by: HOSPITALIST

## 2021-09-05 RX ORDER — LACTULOSE 10 G/15ML
10 SOLUTION ORAL 2 TIMES DAILY
Qty: 946 ML | Refills: 0 | Status: SHIPPED | OUTPATIENT
Start: 2021-09-05 | End: 2021-10-07

## 2021-09-05 RX ORDER — AMOXICILLIN AND CLAVULANATE POTASSIUM 875; 125 MG/1; MG/1
1 TABLET, FILM COATED ORAL EVERY 12 HOURS SCHEDULED
Qty: 14 TABLET | Refills: 0 | Status: SHIPPED | OUTPATIENT
Start: 2021-09-05 | End: 2021-09-12

## 2021-09-05 RX ORDER — SUCRALFATE 1 G/1
1 TABLET ORAL
Qty: 120 TABLET | Refills: 0 | Status: SHIPPED | OUTPATIENT
Start: 2021-09-05 | End: 2021-10-05

## 2021-09-05 RX ORDER — AMOXICILLIN AND CLAVULANATE POTASSIUM 875; 125 MG/1; MG/1
1 TABLET, FILM COATED ORAL EVERY 12 HOURS SCHEDULED
Status: DISCONTINUED | OUTPATIENT
Start: 2021-09-05 | End: 2021-09-05 | Stop reason: HOSPADM

## 2021-09-05 RX ORDER — PANTOPRAZOLE SODIUM 40 MG/1
40 TABLET, DELAYED RELEASE ORAL
Qty: 60 TABLET | Refills: 0 | Status: SHIPPED | OUTPATIENT
Start: 2021-09-05 | End: 2021-10-05

## 2021-09-05 RX ADMIN — TAZOBACTAM SODIUM AND PIPERACILLIN SODIUM 3.38 G: 375; 3 INJECTION, SOLUTION INTRAVENOUS at 06:16

## 2021-09-05 RX ADMIN — PANTOPRAZOLE SODIUM 40 MG: 40 TABLET, DELAYED RELEASE ORAL at 06:16

## 2021-09-05 RX ADMIN — LACTULOSE 10 G: 10 SOLUTION ORAL at 09:59

## 2021-09-05 RX ADMIN — AZITHROMYCIN DIHYDRATE 250 MG: 250 TABLET, FILM COATED ORAL at 09:59

## 2021-09-05 RX ADMIN — SUCRALFATE 1 G: 1 TABLET ORAL at 06:16

## 2021-09-05 NOTE — PROGRESS NOTES
[facility]  Inpatient Progress Note    Reason for Follow Up:  Accidental acetaminophen overdose    Subjective     Interval History:   Sleeping but awakens easily.  He has no abdominal pain.  No nausea or vomiting.  His liver function tests are continuing to go down.  His ammonia level is 79 and up slightly today.  His INR is 1.34 which is fairly stable.  His kidney function is normal.    Current Facility-Administered Medications:   •  azithromycin (ZITHROMAX) tablet 250 mg, 250 mg, Oral, Q24H, Keira Cervantes MD, 250 mg at 09/05/21 0959  •  lactulose (CHRONULAC) 10 GM/15ML solution 10 g, 10 g, Oral, BID, Gopi Peralta MD, 10 g at 09/05/21 0959  •  ondansetron (ZOFRAN) injection 4 mg, 4 mg, Intravenous, Q4H PRN, Gopi Peralta MD, 4 mg at 09/02/21 0143  •  pantoprazole (PROTONIX) EC tablet 40 mg, 40 mg, Oral, BID AC, Gopi Peralta MD, 40 mg at 09/05/21 0616  •  piperacillin-tazobactam (ZOSYN) 3.375 g in iso-osmotic dextrose 50 ml (premix), 3.375 g, Intravenous, Q8H, Gopi Peralta MD, 3.375 g at 09/05/21 0616  •  [COMPLETED] Insert peripheral IV, , , Once **AND** sodium chloride 0.9 % flush 10 mL, 10 mL, Intravenous, PRN, Edwin Hein MD, 10 mL at 09/03/21 0821  •  sodium chloride 0.9 % with KCl 20 mEq/L infusion, 75 mL/hr, Intravenous, Continuous, Gopi Peralta MD, Last Rate: 75 mL/hr at 09/04/21 2109, 75 mL/hr at 09/04/21 2109  •  sucralfate (CARAFATE) tablet 1 g, 1 g, Oral, 4x Daily AC & at Bedtime, Shahbaz Can MD, 1 g at 09/05/21 0616  Review of Systems:    The following systems were reviewed and negative;  constitution, ENT, respiratory, cardiovascular, genitourinary, musculoskeletal and neurological    Objective     Vital Signs  Temp:  [97.7 °F (36.5 °C)-98.3 °F (36.8 °C)] 97.7 °F (36.5 °C)  Heart Rate:  [49-57] 57  Resp:  [18] 18  BP: (135-139)/() 139/91  Body mass index is 39 kg/m².    Intake/Output Summary (Last 24 hours) at 9/5/2021 1006  Last data filed at 9/4/2021  1154  Gross per 24 hour   Intake 50 ml   Output --   Net 50 ml     No intake/output data recorded.     Physical Exam:   General: patient awake, alert and cooperative   Eyes: Normal lids and lashes, no scleral icterus   Neck: supple, normal ROM   Skin: warm and dry, not jaundiced   Cardiovascular: regular rhythm and rate, no murmurs auscultated   Pulm: clear to auscultation bilaterally, regular and unlabored   Abdomen: soft, nontender, nondistended; normal bowel sounds   Extremities: no rash or edema   Psychiatric: Normal mood and behavior; memory intact. No asterixis.      Results Review:     I reviewed the patient's new clinical results.    Results from last 7 days   Lab Units 09/05/21  0412 09/03/21  0521 09/02/21  0425   WBC 10*3/mm3 10.74 6.93 7.90   HEMOGLOBIN g/dL 13.3 12.8* 13.1   HEMATOCRIT % 37.9 36.5* 38.5   PLATELETS 10*3/mm3 206 158 141     Results from last 7 days   Lab Units 09/05/21  0409 09/04/21  0607 09/03/21  1323   SODIUM mmol/L 138 139 140   POTASSIUM mmol/L 4.0 4.0 3.8   CHLORIDE mmol/L 107 107 108*   CO2 mmol/L 20.6* 20.2* 21.1*   BUN mg/dL 7 8 8   CREATININE mg/dL 0.83 0.81 0.88   CALCIUM mg/dL 9.1 8.7 8.5*   BILIRUBIN mg/dL 0.9 0.8 0.8   ALK PHOS U/L 71 71 71   ALT (SGPT) U/L 619* 799* 1,046*   AST (SGOT) U/L 75* 125* 201*   GLUCOSE mg/dL 110* 83 114*     Results from last 7 days   Lab Units 09/05/21  0409 09/04/21  0607 09/03/21  1323   INR  1.34* 1.26* 1.30*     Lab Results   Lab Value Date/Time    LIPASE 15 08/30/2021 2044       Radiology:  XR Panorex   Final Result      CT Head Without Contrast   Final Result         Electronically signed by Sony Vazquez MD on 08-31-21 at 0027      CT Abdomen Pelvis With Contrast   Final Result         Electronically signed by Sony Vazquez MD on 08-31-21 at 0031          Assessment/Plan     Patient Active Problem List   Diagnosis   • Acetaminophen overdose       Assessment/Recommendations:    - Acetaminophen overdose -he is doing well and could  be discharged from a GI standpoint.  -Colonic thickening noted on CAT scan of the abdomen.  I would recommend that he follow-up with Dr. Shahbaz Can and have an outpatient colonoscopy.    I discussed the patients findings and my recommendations with patient and nursing staff.    David Shah MD

## 2021-09-05 NOTE — PROGRESS NOTES
"Daily progress note    Chief complaint  Doing better  No specific complaints  Wants to go home  Denies any nausea vomiting diarrhea    History of present illness  46-year-old white male with no medical problem and no home medications presented to [facility] with nausea vomiting started yesterday early in the morning.  Patient apparently took a lot of Tylenol secondary to toothache.  Patient denies any suicidal homicidal ideation.  Patient denies any Tylenol abuse in the past.  Patient work-up in ER revealed Tylenol toxicity admit for management.      REVIEW OF SYSTEMS  Unremarkable     PHYSICAL EXAM   Blood pressure 149/93, pulse 57, temperature 97.5 °F (36.4 °C), temperature source Oral, resp. rate 18, height 175.3 cm (69\"), weight 120 kg (264 lb 1.6 oz), SpO2 91 %.    GENERAL: Awake and alert, nontoxic-appearing  HENT: nares patent  EYES: no scleral icterus  CV: regular rhythm, regular rate  RESPIRATORY: normal effort  ABDOMEN: soft, mild tenderness without rebound or guarding bowel sounds present  MUSCULOSKELETAL: no deformity  NEURO: alert, moves all extremities, follows commands  SKIN: warm, dry     LAB RESULTS  Lab Results (last 24 hours)     Procedure Component Value Units Date/Time    Ammonia [[id number]]  (Abnormal) Collected: 09/05/21 0409    Specimen: Blood Updated: 09/05/21 0501     Ammonia 79 umol/L     Protime-INR [[id number]]  (Abnormal) Collected: 09/05/21 0409    Specimen: Blood Updated: 09/05/21 0459     Protime 16.3 Seconds      INR 1.34    Comprehensive Metabolic Panel [[id number]]  (Abnormal) Collected: 09/05/21 0409    Specimen: Blood Updated: 09/05/21 0457     Glucose 110 mg/dL      BUN 7 mg/dL      Creatinine 0.83 mg/dL      Sodium 138 mmol/L      Potassium 4.0 mmol/L      Chloride 107 mmol/L      CO2 20.6 mmol/L      Calcium 9.1 mg/dL      Total Protein 6.9 g/dL      Albumin 3.70 g/dL      ALT (SGPT) 619 U/L      AST (SGOT) 75 U/L      Alkaline Phosphatase 71 U/L      Total Bilirubin " 0.9 mg/dL      eGFR Non African Amer 100 mL/min/1.73      Globulin 3.2 gm/dL      A/G Ratio 1.2 g/dL      BUN/Creatinine Ratio 8.4     Anion Gap 10.4 mmol/L     Narrative:      GFR Normal >60  Chronic Kidney Disease <60  Kidney Failure <15      CBC & Differential [[id number]]  (Abnormal) Collected: 09/05/21 0412    Specimen: Blood Updated: 09/05/21 0437    Narrative:      The following orders were created for panel order CBC & Differential.  Procedure                               Abnormality         Status                     ---------                               -----------         ------                     CBC Auto Differential[[id number]]        Abnormal            Final result                 Please view results for these tests on the individual orders.    CBC Auto Differential [[id number]]  (Abnormal) Collected: 09/05/21 0412    Specimen: Blood Updated: 09/05/21 0437     WBC 10.74 10*3/mm3      RBC 4.08 10*6/mm3      Hemoglobin 13.3 g/dL      Hematocrit 37.9 %      MCV 92.9 fL      MCH 32.6 pg      MCHC 35.1 g/dL      RDW 13.1 %      RDW-SD 43.7 fl      MPV 10.1 fL      Platelets 206 10*3/mm3      Neutrophil % 56.3 %      Lymphocyte % 25.0 %      Monocyte % 6.5 %      Eosinophil % 8.7 %      Basophil % 0.8 %      Immature Grans % 2.7 %      Neutrophils, Absolute 6.04 10*3/mm3      Lymphocytes, Absolute 2.69 10*3/mm3      Monocytes, Absolute 0.70 10*3/mm3      Eosinophils, Absolute 0.93 10*3/mm3      Basophils, Absolute 0.09 10*3/mm3      Immature Grans, Absolute 0.29 10*3/mm3      nRBC 0.1 /100 WBC     Blood Culture - Blood, Arm, Left [[id number]] Collected: 08/30/21 2154    Specimen: Blood from Arm, Left Updated: 09/04/21 2215     Blood Culture No growth at 5 days    Blood Culture - Blood, Hand, Left [[id number]] Collected: 09/01/21 1925    Specimen: Blood from Hand, Left Updated: 09/04/21 1945     Blood Culture No growth at 3 days    Blood Culture - Blood, Hand, Right [[id number]] Collected: 09/01/21 1932     Specimen: Blood from Hand, Right Updated: 09/04/21 1945     Blood Culture No growth at 3 days        Imaging Results (Last 24 Hours)     ** No results found for the last 24 hours. **        ECG 12 Lead  Component   Ref Range & Units 8/30/21 2052   QT Interval   ms 331         HEART RATE= 91  bpm  RR Interval= 660  ms  NM Interval= 147  ms  P Horizontal Axis= -19  deg  P Front Axis= 50  deg  QRSD Interval= 93  ms  QT Interval= 331  ms  QRS Axis= 25  deg  T Wave Axis= 25  deg  - BORDERLINE ECG -  Sinus rhythm  Probable left atrial enlargement  NO PRIOR TRACING AVAILABLE FOR COMPARISON         2D echo within normal limits    Current Facility-Administered Medications:   •  amoxicillin-clavulanate (AUGMENTIN) 875-125 MG per tablet 1 tablet, 1 tablet, Oral, Q12H, Scott Dean MD  •  lactulose (CHRONULAC) 10 GM/15ML solution 10 g, 10 g, Oral, BID, Bernadette Peralta MD, 10 g at 09/05/21 0959  •  ondansetron (ZOFRAN) injection 4 mg, 4 mg, Intravenous, Q4H PRN, Bernadette Peralta MD, 4 mg at 09/02/21 0143  •  pantoprazole (PROTONIX) EC tablet 40 mg, 40 mg, Oral, BID AC, Bernadette Peralta MD, 40 mg at 09/05/21 0616  •  [COMPLETED] Insert peripheral IV, , , Once **AND** sodium chloride 0.9 % flush 10 mL, 10 mL, Intravenous, PRN, Edwin Hein MD, 10 mL at 09/03/21 0821  •  sodium chloride 0.9 % with KCl 20 mEq/L infusion, 75 mL/hr, Intravenous, Continuous, Bernadette Peralta MD, Last Rate: 75 mL/hr at 09/04/21 2109, 75 mL/hr at 09/04/21 2109  •  sucralfate (CARAFATE) tablet 1 g, 1 g, Oral, 4x Daily AC & at Bedtime, Shahbaz Can MD, 1 g at 09/05/21 0616     ASSESSMENT  GNB bacteremia and sepsis  Tylenol toxicity resolving  Elevated LFTs improving  Hepatic encephalopathy  Diffuse wall thickening of the colon    PLAN  Discharge home  Discharge summary dictated    BERNADETTE PERALTA MD

## 2021-09-05 NOTE — PLAN OF CARE
Pt d/c home with mother to transport, advised follow ups and not to take tylenol.     [facility]

## 2021-09-05 NOTE — DISCHARGE SUMMARY
Discharge summary    Date of admission 8/30/2021  Date of discharge 9/5/2021            Final diagnosis  GNB bacteremia and sepsis  Tylenol toxicity resolved  Elevated LFTs improving  Hepatic encephalopathy  Diffuse wall thickening of the colon    Discharge medications    Current Facility-Administered Medications:   •  amoxicillin-clavulanate (AUGMENTIN) 875-125 MG per tablet 1 tablet, 1 tablet, Oral, Q12H, Scott Dean MD  •  lactulose (CHRONULAC) 10 GM/15ML solution 10 g, 10 g, Oral, BID, Gopi Peralta MD, 10 g at 09/05/21 0959  •  pantoprazole (PROTONIX) EC tablet 40 mg, 40 mg, Oral, BID AC, Gopi Peralta MD, 40 mg at 09/05/21 0616  •  [COMPLETED] Insert peripheral IV, , , Once **AND** sodium chloride 0.9 % flush 10 mL, 10 mL, Intravenous, PRN, Edwin Hein MD, 10 mL at 09/03/21 0821  •  sucralfate (CARAFATE) tablet 1 g, 1 g, Oral, 4x Daily AC & at Bedtime, Shahbaz Can MD, 1 g at 09/05/21 0616     Consults obtained  Gastroenterology  Infectious disease     Procedures  2D echo within normal limits    Hospital course  46-year-old white male with no medical problem no home medication presented to [facility], [place], operated by [facility] emergency room when he took a lot of Tylenol secondary to toothache.  Patient found to have Tylenol toxicity with elevated LFTs admit for management.  Patient admitted and received acetodate per protocol and IV fluid and IV antibiotics started as he has elevated lactic acid consistent with sepsis.  Patient liver enzymes improved and almost normal.  Patient does have hepatic encephalopathy with high ammonia level and elevated INR which has been treated accordingly.  Patient symptoms completely resolved repeat blood cultures are negative but she does have abnormal colon and he needs outpatient colonoscopy.  Patient antibiotics changed to by mouth we will continue for 7 more days.  Patient tolerating diet and has no symptoms whatsoever.  Patient be discharged on oral antibiotics with close  follow-up with gastroenterology.    Discharge diet regular    Activity as tolerated    Medication as above    Patient will follow with primary doctor and follow with gastroenterology infectious disease per the instruction and take medication as directed    BERNADETTE CELESTE MD

## 2021-09-05 NOTE — PROGRESS NOTES
"  Infectious Diseases Progress Note        [facility]  [place]: 6 days  Patient Identification:  Name: Pascual Patel  Age: 46 y.o.  Sex: male  :  1975  MRN: [id number]         Primary Care Physician: Provider, No Known            Subjective: Feeling better and denies any specific complaints.  Interval History: See consultation note.    Objective:    Scheduled Meds:azithromycin, 250 mg, Oral, Q24H  lactulose, 10 g, Oral, BID  pantoprazole, 40 mg, Oral, BID AC  piperacillin-tazobactam, 3.375 g, Intravenous, Q8H  sucralfate, 1 g, Oral, 4x Daily AC & at Bedtime      Continuous Infusions:sodium chloride 0.9 % with KCl 20 mEq, 75 mL/hr, Last Rate: 75 mL/hr (219)        Vital signs in last 24 hours:  Temp:  [97.7 °F (36.5 °C)-98.1 °F (36.7 °C)] 97.7 °F (36.5 °C)  Heart Rate:  [49-57] 57  Resp:  [18] 18  BP: (139)/() 139/91    Intake/Output:  No intake or output data in the 24 hours ending 21 1307    Exam:  /91 (BP Location: Right arm, Patient Position: Lying)   Pulse 57   Temp 97.7 °F (36.5 °C) (Oral)   Resp 18   Ht 175.3 cm (69\")   Wt 120 kg (264 lb 1.6 oz)   SpO2 91%   BMI 39.00 kg/m²   Patient is examined using the personal protective equipment as per guidelines from infection control for this particular patient as enacted.  Hand washing was performed before and after patient interaction.  General Appearance:    Alert, cooperative, no distress, AAOx3                          Head:    Normocephalic, without obvious abnormality, atraumatic                           Eyes:    PERRL, conjunctivae/corneas clear, EOM's intact, both eyes                         Throat:   Lips, tongue, gums normal; oral mucosa pink and moist                           Neck:   Supple, symmetrical, trachea midline, no JVD                         Lungs:    Clear to auscultation bilaterally, respirations unlabored                 Chest Wall:    No tenderness or deformity                          " Heart:    Regular rate and rhythm, S1 and S2 normal, no murmur, no rub                                         or gallop                  Abdomen:     Soft, non-tender, bowel sounds active, no masses, no                                                        organomegaly                  Extremities:   Extremities normal, atraumatic, no cyanosis or edema                        Pulses:   Pulses palpable in all extremities                            Skin:   Skin is warm and dry,  no rashes or palpable lesions                  Neurologic:   CNII-XII intact, motor strength grossly intact, sensation grossly                                         intact to light touch, no focal deficits noted       Data Review:    I reviewed the patient's new clinical results.  Results from last 7 days   Lab Units 09/05/21  0412 09/03/21  0521 09/02/21  0425 09/01/21  0530 08/31/21  0307 08/30/21  2044   WBC 10*3/mm3 10.74 6.93 7.90 9.48 16.85* 17.77*   HEMOGLOBIN g/dL 13.3 12.8* 13.1 13.2 16.6 17.6   PLATELETS 10*3/mm3 206 158 141 139* 153 169     Results from last 7 days   Lab Units 09/05/21  0409 09/04/21  0607 09/03/21  1323 09/03/21  0521 09/02/21  0425 09/01/21  1209 09/01/21  0530   SODIUM mmol/L 138 139 140 136 137 136 137   POTASSIUM mmol/L 4.0 4.0 3.8 3.7 3.5 3.4* 3.3*   CHLORIDE mmol/L 107 107 108* 106 108* 106 106   CO2 mmol/L 20.6* 20.2* 21.1* 20.3* 20.9* 20.7* 20.5*   BUN mg/dL 7 8 8 8 9 12 13   CREATININE mg/dL 0.83 0.81 0.88 0.83 0.77 0.78 0.65*   CALCIUM mg/dL 9.1 8.7 8.5* 8.6 8.3* 8.6 8.4*   GLUCOSE mg/dL 110* 83 114* 89 77 100* 100*     Microbiology Results (last 10 days)     Procedure Component Value - Date/Time    Blood Culture - Blood, Hand, Right [[id number]] Collected: 09/01/21 1932    Lab Status: Preliminary result Specimen: Blood from Hand, Right Updated: 09/02/21 1945     Blood Culture No growth at 24 hours    Blood Culture - Blood, Hand, Left [[id number]] Collected: 09/01/21 1925    Lab Status: Preliminary  result Specimen: Blood from Hand, Left Updated: 09/02/21 1945     Blood Culture No growth at 24 hours    COVID PRE-OP / PRE-PROCEDURE SCREENING ORDER (NO ISOLATION) - Swab, Nasopharynx [[id number]]  (Normal) Collected: 08/30/21 2155    Lab Status: Final result Specimen: Swab from Nasopharynx Updated: 08/31/21 0002    Narrative:      The following orders were created for panel order COVID PRE-OP / PRE-PROCEDURE SCREENING ORDER (NO ISOLATION) - Swab, Nasopharynx.  Procedure                               Abnormality         Status                     ---------                               -----------         ------                     COVID-19,[facility] IN-HOUSE...[[id number]]  Normal              Final result                 Please view results for these tests on the individual orders.    COVID-19,[facility]-HOU[facility] CEPHEID/LINWOOD NP SWAB IN TRANSPORT MEDIA 8-12 HR TAT - Swab, Nasopharynx [[id number]]  (Normal) Collected: 08/30/21 2155    Lab Status: Final result Specimen: Swab from Nasopharynx Updated: 08/31/21 0002     COVID19 Not Detected    Narrative:      Fact sheet for providers: https://www.fda.gov/media/709919/download     Fact sheet for patients: https://www.fda.gov/media/416841/download    Blood Culture - Blood, Arm, Left [[id number]]  (Abnormal)  (Susceptibility) Collected: 08/30/21 2154    Lab Status: Final result Specimen: Blood from Arm, Left Updated: 09/03/21 0640     Blood Culture Brevundimonas diminuta / vesicularis     Gram Stain Aerobic Bottle Gram negative bacilli    Susceptibility      Brevundimonas diminuta / vesicularis      OLIVA      Cefepime Susceptible      Ceftazidime Resistant      Gentamicin Susceptible      Levofloxacin Resistant      Meropenem Susceptible      Piperacillin + Tazobactam Susceptible      Tetracycline Susceptible               Linear View                   Blood Culture - Blood, Arm, Left [[id number]] Collected: 08/30/21 2154    Lab Status: Preliminary result Specimen: Blood from Arm,  Left Updated: 09/02/21 2215     Blood Culture No growth at 3 days    Blood Culture ID, PCR - Blood, Arm, Left [[id number]]  (Normal) Collected: 08/30/21 2154    Lab Status: Final result Specimen: Blood from Arm, Left Updated: 09/01/21 1437     BCID, PCR Negative by BCID PCR. Culture to Follow.              Assessment:  1-gram-negative bacterem 1 out of 2 with Brevundimonas  2-poor dentition with possible dental abscess  3-status post Tylenol overdose with transaminitis  4-CT scan abnormality seen on the colon but with no clinical symptoms or signs to go with it significance of the CT scan finding unclear  5-other diagnosis per primary team.        Recommendations/Discussions:    Echo -    · Presence of Brevundimonas diminuta / vesicularis in the blood culture is likely contaminant during the process of collection or issue during the processing of his blood culture sample in the microbiology department as this pathogen does not reflect his underlying infectious process which is odontogenic infection.  · Follow-up on repeat blood culture  -   ·   · With echocardiogram is okay  ·  switch to oral Augmentin for couple of weeks with outpatient follow-up with the oral maxillofacial surgery service or dentist to address his infected decaying teeth.    Scott Dean MD  9/5/2021  13:07 EDT

## 2021-09-06 ENCOUNTER — READMISSION MANAGEMENT (OUTPATIENT)
Dept: CALL CENTER | Facility: HOSPITAL | Age: 46
End: 2021-09-06

## 2021-09-06 LAB
BACTERIA SPEC AEROBE CULT: NORMAL
BACTERIA SPEC AEROBE CULT: NORMAL

## 2021-09-06 NOTE — OUTREACH NOTE
Prep Survey      Responses   [facility] facility patient discharged from?  [facility]   Is LACE score < 7 ?  No   Emergency Room discharge w/ pulse ox?  No   Eligibility  Readm Mgmt   Discharge diagnosis  GNB bacteremia and sepsis   Does the patient have one of the following disease processes/diagnoses(primary or secondary)?  Sepsis   Does the patient have Home health ordered?  No   Is there a DME ordered?  No   Prep survey completed?  Yes          Lucinda Beebe RN

## 2021-09-06 NOTE — CASE MANAGEMENT/SOCIAL WORK
Case Management Discharge Note      Final Note: Patient DCd home    Provided Post Acute Provider List?: N/A  Provided Post Acute Provider Quality & Resource List?: N/A    Selected Continued Care - Discharged on 9/5/2021 Admission date: 8/30/2021 - Discharge disposition: Home or Self Care    Destination    No services have been selected for the patient.              Durable Medical Equipment    No services have been selected for the patient.              Dialysis/Infusion    No services have been selected for the patient.              Home Medical Care    No services have been selected for the patient.              Therapy    No services have been selected for the patient.              Community Resources    No services have been selected for the patient.              Community & DME    No services have been selected for the patient.                  Transportation Services  Private: Car    Final Discharge Disposition Code: 01 - home or self-care

## 2021-09-08 ENCOUNTER — READMISSION MANAGEMENT (OUTPATIENT)
Dept: CALL CENTER | Facility: HOSPITAL | Age: 46
End: 2021-09-08

## 2021-09-08 NOTE — OUTREACH NOTE
Sepsis Week 1 Survey      Responses   [facility] patient discharged from?  [place]   Does the patient have one of the following disease processes/diagnoses(primary or secondary)?  Sepsis   Week 1 attempt successful?  No   Unsuccessful attempts  Attempt 1   Discharge diagnosis  GNB bacteremia and sepsis          Adore Cordero RN

## 2021-09-16 ENCOUNTER — READMISSION MANAGEMENT (OUTPATIENT)
Dept: CALL CENTER | Facility: HOSPITAL | Age: 46
End: 2021-09-16

## 2021-09-16 NOTE — OUTREACH NOTE
Sepsis Week 2 Survey      Responses   [facility]   Does the patient have one of the following disease processes/diagnoses(primary or secondary)?  Sepsis   Week 2 attempt successful?  Yes   Call start time  0844   Call end time  0852   Meds reviewed with patient/caregiver?  Yes   Is the patient having any side effects they believe may be caused by any medication additions or changes?  No   Does the patient have all medications related to this admission filled (includes all antibiotics, inhalers, nebulizers,steroids,etc.)  Yes   Is the patient taking all medications as directed (includes completed medication regime)?  Yes   Does the patient have a primary care provider?   Yes   Does the patient have an appointment with their PCP within 7 days of discharge?  Yes   Has the patient kept scheduled appointments due by today?  Yes   Has home health visited the patient within 72 hours of discharge?  No   Did the patient receive a copy of their discharge instructions?  Yes   Nursing interventions  Reviewed instructions with patient   What is the patient's perception of their health status since discharge?  Improving   Nursing interventions  Nurse provided patient education   Is the patient/caregiver able to teach back Sepsis?  S - Shivering,fever or very cold, E - Extreme pain or generalized discomfort (worst ever,especially abdomen), P - Pale or discolored skin, S - Sleepy, difficult to arouse,confused, I -   I feel like I might die-a feeling of hopelessness, S - Short of breath   Nursing interventions  Nurse provided reassurance to patient   Is patient/caregiver able to teach back steps to recovery at home?  Set small, achievable goals for return to baseline health, Rest and regain strength, Talk about feelings with family/friends, Record milestones and struggles in a journal, Learn about sepsis(sepsis.org), Eat a balanced diet, Exercise as tolerated   Is the patient/caregiver able  to teach back signs and symptoms of worsening condition:  Fever, Hyperthermia, Rapid heart rate (>90), Shortness of breath/rapid respiratory rate, Altered mental status(confusion/coma), Edema, High blood glucose without diabetes   If the patient is a current smoker, are they able to teach back resources for cessation?  Not a smoker   Is the patient/caregiver able to teach back the hierarchy of who to call/visit for symptoms/problems? PCP, Specialist, Home health nurse, Urgent Care, ED, 911  Yes   Week 2 call completed?  Yes   Revoked  No further contact(revokes)-requires comment   Is the patient interested in additional calls from an ambulatory ?  NOTE:  applies to high risk patients requiring additional follow-up.  No   Graduated/Revoked comments  He states is doing well, no need for calls.           Karina Perez RN